# Patient Record
Sex: FEMALE | Race: WHITE | Employment: OTHER | URBAN - METROPOLITAN AREA
[De-identification: names, ages, dates, MRNs, and addresses within clinical notes are randomized per-mention and may not be internally consistent; named-entity substitution may affect disease eponyms.]

---

## 2017-04-20 ENCOUNTER — HOSPITAL ENCOUNTER (OUTPATIENT)
Age: 67
Setting detail: SPECIMEN
Discharge: HOME | End: 2017-04-20
Payer: COMMERCIAL

## 2017-04-20 ENCOUNTER — OFFICE VISIT (OUTPATIENT)
Dept: OBGYN CLINIC | Age: 67
End: 2017-04-20
Payer: COMMERCIAL

## 2017-04-20 VITALS
DIASTOLIC BLOOD PRESSURE: 90 MMHG | SYSTOLIC BLOOD PRESSURE: 140 MMHG | HEIGHT: 65 IN | BODY MASS INDEX: 28.72 KG/M2 | WEIGHT: 172.4 LBS

## 2017-04-20 DIAGNOSIS — Z01.419 VISIT FOR GYNECOLOGIC EXAMINATION: Primary | ICD-10-CM

## 2017-04-20 DIAGNOSIS — Z12.11 SCREEN FOR COLON CANCER: ICD-10-CM

## 2017-04-20 DIAGNOSIS — Z12.31 ENCOUNTER FOR SCREENING MAMMOGRAM FOR BREAST CANCER: ICD-10-CM

## 2017-04-20 PROCEDURE — 99396 PREV VISIT EST AGE 40-64: CPT | Performed by: OBSTETRICS & GYNECOLOGY

## 2017-04-20 RX ORDER — ESTRADIOL 0.1 MG/G
1 CREAM VAGINAL DAILY
Qty: 1 TUBE | Refills: 3 | Status: SHIPPED | OUTPATIENT
Start: 2017-04-20 | End: 2022-07-16

## 2017-04-20 ASSESSMENT — ENCOUNTER SYMPTOMS
ABDOMINAL PAIN: 0
ABDOMINAL DISTENTION: 0
DIARRHEA: 0
CONSTIPATION: 0
SHORTNESS OF BREATH: 0
BACK PAIN: 0
COUGH: 0

## 2017-04-25 LAB — CYTOLOGY REPORT: NORMAL

## 2017-04-28 ENCOUNTER — HOSPITAL ENCOUNTER (OUTPATIENT)
Age: 67
Discharge: HOME | End: 2017-04-28
Payer: COMMERCIAL

## 2017-04-28 LAB
ABSOLUTE EOS #: 0.2 K/UL (ref 0–0.4)
ABSOLUTE LYMPH #: 2.8 K/UL (ref 1–4.8)
ABSOLUTE MONO #: 0.5 K/UL (ref 0.1–1.3)
ALBUMIN SERPL-MCNC: 4.4 G/DL (ref 3.5–5.2)
ALBUMIN/GLOBULIN RATIO: ABNORMAL (ref 1–2.5)
ALP BLD-CCNC: 117 U/L (ref 35–104)
ALT SERPL-CCNC: 17 U/L (ref 5–33)
ANION GAP SERPL CALCULATED.3IONS-SCNC: 14 MMOL/L (ref 9–17)
AST SERPL-CCNC: 20 U/L
BASOPHILS # BLD: 1 %
BASOPHILS ABSOLUTE: 0.1 K/UL (ref 0–0.2)
BILIRUB SERPL-MCNC: 0.24 MG/DL (ref 0.3–1.2)
BUN BLDV-MCNC: 20 MG/DL (ref 8–23)
BUN/CREAT BLD: ABNORMAL (ref 9–20)
CALCIUM SERPL-MCNC: 9.6 MG/DL (ref 8.6–10.4)
CHLORIDE BLD-SCNC: 107 MMOL/L (ref 98–107)
CHOLESTEROL/HDL RATIO: 3.2
CHOLESTEROL: 171 MG/DL
CO2: 24 MMOL/L (ref 20–31)
CREAT SERPL-MCNC: 0.75 MG/DL (ref 0.5–0.9)
DIFFERENTIAL TYPE: NORMAL
EOSINOPHILS RELATIVE PERCENT: 3 %
GFR AFRICAN AMERICAN: >60 ML/MIN
GFR NON-AFRICAN AMERICAN: >60 ML/MIN
GFR SERPL CREATININE-BSD FRML MDRD: ABNORMAL ML/MIN/{1.73_M2}
GFR SERPL CREATININE-BSD FRML MDRD: ABNORMAL ML/MIN/{1.73_M2}
GLUCOSE BLD-MCNC: 87 MG/DL (ref 70–99)
HCT VFR BLD CALC: 44.7 % (ref 36–46)
HDLC SERPL-MCNC: 54 MG/DL
HEMOGLOBIN: 14.8 G/DL (ref 12–16)
LDL CHOLESTEROL: 96 MG/DL (ref 0–130)
LYMPHOCYTES # BLD: 42 %
MCH RBC QN AUTO: 30.3 PG (ref 26–34)
MCHC RBC AUTO-ENTMCNC: 33.2 G/DL (ref 31–37)
MCV RBC AUTO: 91.3 FL (ref 80–100)
MONOCYTES # BLD: 8 %
PDW BLD-RTO: 13.5 % (ref 11.5–14.9)
PLATELET # BLD: 252 K/UL (ref 150–450)
PLATELET ESTIMATE: NORMAL
PMV BLD AUTO: 7.7 FL (ref 6–12)
POTASSIUM SERPL-SCNC: 4.4 MMOL/L (ref 3.7–5.3)
RBC # BLD: 4.89 M/UL (ref 4–5.2)
RBC # BLD: NORMAL 10*6/UL
SEG NEUTROPHILS: 46 %
SEGMENTED NEUTROPHILS ABSOLUTE COUNT: 3.1 K/UL (ref 1.3–9.1)
SODIUM BLD-SCNC: 145 MMOL/L (ref 135–144)
THYROXINE, FREE: 0.96 NG/DL (ref 0.93–1.7)
TOTAL PROTEIN: 7.2 G/DL (ref 6.4–8.3)
TRIGL SERPL-MCNC: 107 MG/DL
TSH SERPL DL<=0.05 MIU/L-ACNC: 0.78 MIU/L (ref 0.3–5)
VLDLC SERPL CALC-MCNC: NORMAL MG/DL (ref 1–30)
WBC # BLD: 6.7 K/UL (ref 3.5–11)
WBC # BLD: NORMAL 10*3/UL

## 2017-04-28 PROCEDURE — 36415 COLL VENOUS BLD VENIPUNCTURE: CPT

## 2017-04-28 PROCEDURE — 80053 COMPREHEN METABOLIC PANEL: CPT

## 2017-04-28 PROCEDURE — 84439 ASSAY OF FREE THYROXINE: CPT

## 2017-04-28 PROCEDURE — 80061 LIPID PANEL: CPT

## 2017-04-28 PROCEDURE — 84443 ASSAY THYROID STIM HORMONE: CPT

## 2017-04-28 PROCEDURE — 85025 COMPLETE CBC W/AUTO DIFF WBC: CPT

## 2017-05-08 ENCOUNTER — HOSPITAL ENCOUNTER (OUTPATIENT)
Dept: WOMENS IMAGING | Age: 67
Discharge: HOME | End: 2017-05-08
Payer: COMMERCIAL

## 2017-05-08 DIAGNOSIS — Z12.31 ENCOUNTER FOR SCREENING MAMMOGRAM FOR BREAST CANCER: ICD-10-CM

## 2017-05-08 PROCEDURE — 77063 BREAST TOMOSYNTHESIS BI: CPT

## 2017-06-22 ENCOUNTER — HOSPITAL ENCOUNTER (OUTPATIENT)
Dept: WOMENS IMAGING | Age: 67
Discharge: HOME | End: 2017-06-22
Payer: COMMERCIAL

## 2017-06-22 DIAGNOSIS — Z78.0 POST-MENOPAUSAL: ICD-10-CM

## 2017-06-22 PROCEDURE — 77080 DXA BONE DENSITY AXIAL: CPT

## 2017-11-13 ENCOUNTER — HOSPITAL ENCOUNTER (OUTPATIENT)
Age: 67
Discharge: HOME | End: 2017-11-13
Payer: COMMERCIAL

## 2017-11-13 LAB
ABSOLUTE EOS #: 0.2 K/UL (ref 0–0.4)
ABSOLUTE IMMATURE GRANULOCYTE: NORMAL K/UL (ref 0–0.3)
ABSOLUTE LYMPH #: 2.5 K/UL (ref 1–4.8)
ABSOLUTE MONO #: 0.5 K/UL (ref 0.1–1.3)
ALBUMIN SERPL-MCNC: 4.3 G/DL (ref 3.5–5.2)
ALBUMIN/GLOBULIN RATIO: ABNORMAL (ref 1–2.5)
ALP BLD-CCNC: 110 U/L (ref 35–104)
ALT SERPL-CCNC: 15 U/L (ref 5–33)
ANION GAP SERPL CALCULATED.3IONS-SCNC: 14 MMOL/L (ref 9–17)
AST SERPL-CCNC: 18 U/L
BASOPHILS # BLD: 2 %
BASOPHILS ABSOLUTE: 0.1 K/UL (ref 0–0.2)
BILIRUB SERPL-MCNC: 0.25 MG/DL (ref 0.3–1.2)
BUN BLDV-MCNC: 14 MG/DL (ref 8–23)
BUN/CREAT BLD: ABNORMAL (ref 9–20)
CALCIUM SERPL-MCNC: 9.6 MG/DL (ref 8.6–10.4)
CHLORIDE BLD-SCNC: 106 MMOL/L (ref 98–107)
CHOLESTEROL/HDL RATIO: 3.5
CHOLESTEROL: 187 MG/DL
CO2: 24 MMOL/L (ref 20–31)
CREAT SERPL-MCNC: 0.66 MG/DL (ref 0.5–0.9)
DIFFERENTIAL TYPE: NORMAL
EOSINOPHILS RELATIVE PERCENT: 3 %
GFR AFRICAN AMERICAN: >60 ML/MIN
GFR NON-AFRICAN AMERICAN: >60 ML/MIN
GFR SERPL CREATININE-BSD FRML MDRD: ABNORMAL ML/MIN/{1.73_M2}
GFR SERPL CREATININE-BSD FRML MDRD: ABNORMAL ML/MIN/{1.73_M2}
GLUCOSE BLD-MCNC: 89 MG/DL (ref 70–99)
HCT VFR BLD CALC: 42.5 % (ref 36–46)
HDLC SERPL-MCNC: 54 MG/DL
HEMOGLOBIN: 14.5 G/DL (ref 12–16)
IMMATURE GRANULOCYTES: NORMAL %
LDL CHOLESTEROL: 105 MG/DL (ref 0–130)
LYMPHOCYTES # BLD: 37 %
MCH RBC QN AUTO: 31.5 PG (ref 26–34)
MCHC RBC AUTO-ENTMCNC: 34 G/DL (ref 31–37)
MCV RBC AUTO: 92.6 FL (ref 80–100)
MONOCYTES # BLD: 7 %
PDW BLD-RTO: 13.4 % (ref 11.5–14.9)
PLATELET # BLD: 293 K/UL (ref 150–450)
PLATELET ESTIMATE: NORMAL
PMV BLD AUTO: 7 FL (ref 6–12)
POTASSIUM SERPL-SCNC: 4.2 MMOL/L (ref 3.7–5.3)
RBC # BLD: 4.6 M/UL (ref 4–5.2)
RBC # BLD: NORMAL 10*6/UL
SEG NEUTROPHILS: 51 %
SEGMENTED NEUTROPHILS ABSOLUTE COUNT: 3.5 K/UL (ref 1.3–9.1)
SODIUM BLD-SCNC: 144 MMOL/L (ref 135–144)
THYROXINE, FREE: 1.1 NG/DL (ref 0.93–1.7)
TOTAL PROTEIN: 7.5 G/DL (ref 6.4–8.3)
TRIGL SERPL-MCNC: 140 MG/DL
TSH SERPL DL<=0.05 MIU/L-ACNC: 0.72 MIU/L (ref 0.3–5)
VLDLC SERPL CALC-MCNC: NORMAL MG/DL (ref 1–30)
WBC # BLD: 6.9 K/UL (ref 3.5–11)
WBC # BLD: NORMAL 10*3/UL

## 2017-11-13 PROCEDURE — 84439 ASSAY OF FREE THYROXINE: CPT

## 2017-11-13 PROCEDURE — 85025 COMPLETE CBC W/AUTO DIFF WBC: CPT

## 2017-11-13 PROCEDURE — 80053 COMPREHEN METABOLIC PANEL: CPT

## 2017-11-13 PROCEDURE — 84443 ASSAY THYROID STIM HORMONE: CPT

## 2017-11-13 PROCEDURE — 80061 LIPID PANEL: CPT

## 2017-11-13 PROCEDURE — 36415 COLL VENOUS BLD VENIPUNCTURE: CPT

## 2018-03-12 ENCOUNTER — HOSPITAL ENCOUNTER (OUTPATIENT)
Age: 68
Discharge: HOME | End: 2018-03-12
Payer: COMMERCIAL

## 2018-03-12 ENCOUNTER — HOSPITAL ENCOUNTER (OUTPATIENT)
Age: 68
Discharge: HOME | End: 2018-03-14
Payer: COMMERCIAL

## 2018-03-12 ENCOUNTER — HOSPITAL ENCOUNTER (OUTPATIENT)
Dept: GENERAL RADIOLOGY | Age: 68
Discharge: HOME | End: 2018-03-14
Payer: COMMERCIAL

## 2018-03-12 DIAGNOSIS — R05.9 COUGH: ICD-10-CM

## 2018-03-12 PROCEDURE — 87798 DETECT AGENT NOS DNA AMP: CPT

## 2018-03-12 PROCEDURE — 71046 X-RAY EXAM CHEST 2 VIEWS: CPT

## 2018-03-15 LAB
BORDETELLA PARAPERTUSSIS PCR: NOT DETECTED
BORDETELLA PERTUSSIS PCR: NOT DETECTED
BORDETELLA SOURCE: NORMAL

## 2018-04-25 ENCOUNTER — HOSPITAL ENCOUNTER (OUTPATIENT)
Age: 68
Setting detail: SPECIMEN
Discharge: HOME | End: 2018-04-25
Payer: COMMERCIAL

## 2018-04-25 ENCOUNTER — OFFICE VISIT (OUTPATIENT)
Dept: OBGYN CLINIC | Age: 68
End: 2018-04-25
Payer: COMMERCIAL

## 2018-04-25 VITALS
BODY MASS INDEX: 31.99 KG/M2 | DIASTOLIC BLOOD PRESSURE: 78 MMHG | SYSTOLIC BLOOD PRESSURE: 126 MMHG | HEIGHT: 65 IN | WEIGHT: 192 LBS

## 2018-04-25 DIAGNOSIS — Z01.419 VISIT FOR GYNECOLOGIC EXAMINATION: Primary | ICD-10-CM

## 2018-04-25 DIAGNOSIS — Z12.39 SCREENING FOR BREAST CANCER: ICD-10-CM

## 2018-04-25 DIAGNOSIS — Z12.11 ENCOUNTER FOR FIT (FECAL IMMUNOCHEMICAL TEST) SCREENING: ICD-10-CM

## 2018-04-25 PROCEDURE — 99397 PER PM REEVAL EST PAT 65+ YR: CPT | Performed by: OBSTETRICS & GYNECOLOGY

## 2018-04-25 ASSESSMENT — ENCOUNTER SYMPTOMS
ABDOMINAL DISTENTION: 0
BACK PAIN: 1
DIARRHEA: 0
COUGH: 0
SHORTNESS OF BREATH: 0
ABDOMINAL PAIN: 0
CONSTIPATION: 0

## 2018-04-25 ASSESSMENT — PATIENT HEALTH QUESTIONNAIRE - PHQ9
SUM OF ALL RESPONSES TO PHQ9 QUESTIONS 1 & 2: 0
2. FEELING DOWN, DEPRESSED OR HOPELESS: 0
1. LITTLE INTEREST OR PLEASURE IN DOING THINGS: 0
SUM OF ALL RESPONSES TO PHQ QUESTIONS 1-9: 0

## 2018-05-09 ENCOUNTER — HOSPITAL ENCOUNTER (OUTPATIENT)
Dept: WOMENS IMAGING | Age: 68
Discharge: HOME | End: 2018-05-11
Payer: COMMERCIAL

## 2018-05-09 DIAGNOSIS — Z12.39 SCREENING FOR BREAST CANCER: ICD-10-CM

## 2018-05-09 PROCEDURE — 77063 BREAST TOMOSYNTHESIS BI: CPT

## 2018-05-15 DIAGNOSIS — Z12.11 ENCOUNTER FOR FIT (FECAL IMMUNOCHEMICAL TEST) SCREENING: ICD-10-CM

## 2018-05-15 LAB
CONTROL: PRESENT
HEMOCCULT STL QL: NEGATIVE

## 2018-05-15 PROCEDURE — 82274 ASSAY TEST FOR BLOOD FECAL: CPT | Performed by: OBSTETRICS & GYNECOLOGY

## 2018-05-16 ENCOUNTER — HOSPITAL ENCOUNTER (OUTPATIENT)
Age: 68
Discharge: HOME | End: 2018-05-16
Payer: COMMERCIAL

## 2018-05-16 LAB
ALBUMIN SERPL-MCNC: 4 G/DL (ref 3.5–5.2)
ALBUMIN/GLOBULIN RATIO: ABNORMAL (ref 1–2.5)
ALP BLD-CCNC: 117 U/L (ref 35–104)
ALT SERPL-CCNC: 20 U/L (ref 5–33)
ANION GAP SERPL CALCULATED.3IONS-SCNC: 16 MMOL/L (ref 9–17)
AST SERPL-CCNC: 19 U/L
BILIRUB SERPL-MCNC: 0.16 MG/DL (ref 0.3–1.2)
BUN BLDV-MCNC: 18 MG/DL (ref 8–23)
BUN/CREAT BLD: ABNORMAL (ref 9–20)
CALCIUM SERPL-MCNC: 9.3 MG/DL (ref 8.6–10.4)
CHLORIDE BLD-SCNC: 107 MMOL/L (ref 98–107)
CHOLESTEROL/HDL RATIO: 3.7
CHOLESTEROL: 174 MG/DL
CO2: 19 MMOL/L (ref 20–31)
CREAT SERPL-MCNC: 0.69 MG/DL (ref 0.5–0.9)
GFR AFRICAN AMERICAN: >60 ML/MIN
GFR NON-AFRICAN AMERICAN: >60 ML/MIN
GFR SERPL CREATININE-BSD FRML MDRD: ABNORMAL ML/MIN/{1.73_M2}
GFR SERPL CREATININE-BSD FRML MDRD: ABNORMAL ML/MIN/{1.73_M2}
GLUCOSE BLD-MCNC: 91 MG/DL (ref 70–99)
HCT VFR BLD CALC: 41.2 % (ref 36–46)
HDLC SERPL-MCNC: 47 MG/DL
HEMOGLOBIN: 14.4 G/DL (ref 12–16)
LDL CHOLESTEROL: 93 MG/DL (ref 0–130)
MCH RBC QN AUTO: 32 PG (ref 26–34)
MCHC RBC AUTO-ENTMCNC: 35 G/DL (ref 31–37)
MCV RBC AUTO: 91.2 FL (ref 80–100)
NRBC AUTOMATED: NORMAL PER 100 WBC
PDW BLD-RTO: 13.4 % (ref 11.5–14.9)
PLATELET # BLD: 298 K/UL (ref 150–450)
PMV BLD AUTO: 7.3 FL (ref 6–12)
POTASSIUM SERPL-SCNC: 4.2 MMOL/L (ref 3.7–5.3)
RBC # BLD: 4.51 M/UL (ref 4–5.2)
SODIUM BLD-SCNC: 142 MMOL/L (ref 135–144)
THYROXINE, FREE: 1.04 NG/DL (ref 0.93–1.7)
TOTAL PROTEIN: 7.1 G/DL (ref 6.4–8.3)
TRIGL SERPL-MCNC: 169 MG/DL
TSH SERPL DL<=0.05 MIU/L-ACNC: 0.96 MIU/L (ref 0.3–5)
VLDLC SERPL CALC-MCNC: ABNORMAL MG/DL (ref 1–30)
WBC # BLD: 7.5 K/UL (ref 3.5–11)

## 2018-05-16 PROCEDURE — 80061 LIPID PANEL: CPT

## 2018-05-16 PROCEDURE — 84443 ASSAY THYROID STIM HORMONE: CPT

## 2018-05-16 PROCEDURE — 85027 COMPLETE CBC AUTOMATED: CPT

## 2018-05-16 PROCEDURE — 84439 ASSAY OF FREE THYROXINE: CPT

## 2018-05-16 PROCEDURE — 36415 COLL VENOUS BLD VENIPUNCTURE: CPT

## 2018-05-16 PROCEDURE — 80053 COMPREHEN METABOLIC PANEL: CPT

## 2018-05-17 ENCOUNTER — TELEPHONE (OUTPATIENT)
Dept: OBGYN CLINIC | Age: 68
End: 2018-05-17

## 2018-05-17 LAB — CYTOLOGY REPORT: NORMAL

## 2018-11-16 ENCOUNTER — HOSPITAL ENCOUNTER (OUTPATIENT)
Dept: GENERAL RADIOLOGY | Age: 68
Discharge: HOME | End: 2018-11-18
Payer: COMMERCIAL

## 2018-11-16 ENCOUNTER — HOSPITAL ENCOUNTER (OUTPATIENT)
Age: 68
Discharge: HOME | End: 2018-11-18
Payer: COMMERCIAL

## 2018-11-16 DIAGNOSIS — J40 BRONCHITIS: ICD-10-CM

## 2018-11-16 DIAGNOSIS — J45.21 EXACERBATION OF INTERMITTENT ASTHMA, UNSPECIFIED ASTHMA SEVERITY: ICD-10-CM

## 2018-11-16 PROCEDURE — 71046 X-RAY EXAM CHEST 2 VIEWS: CPT

## 2018-11-29 ENCOUNTER — HOSPITAL ENCOUNTER (OUTPATIENT)
Dept: CT IMAGING | Age: 68
Discharge: HOME | End: 2018-12-01
Payer: COMMERCIAL

## 2018-11-29 DIAGNOSIS — I26.99 OTHER PULMONARY EMBOLISM WITHOUT ACUTE COR PULMONALE, UNSPECIFIED CHRONICITY (HCC): ICD-10-CM

## 2018-11-29 LAB
BUN BLDV-MCNC: 17 MG/DL (ref 8–23)
CREAT SERPL-MCNC: 0.67 MG/DL (ref 0.5–0.9)
GFR AFRICAN AMERICAN: >60 ML/MIN
GFR NON-AFRICAN AMERICAN: >60 ML/MIN
GFR SERPL CREATININE-BSD FRML MDRD: NORMAL ML/MIN/{1.73_M2}
GFR SERPL CREATININE-BSD FRML MDRD: NORMAL ML/MIN/{1.73_M2}

## 2018-11-29 PROCEDURE — 36415 COLL VENOUS BLD VENIPUNCTURE: CPT

## 2018-11-29 PROCEDURE — 82565 ASSAY OF CREATININE: CPT

## 2018-11-29 PROCEDURE — 2580000003 HC RX 258: Performed by: FAMILY MEDICINE

## 2018-11-29 PROCEDURE — 6360000004 HC RX CONTRAST MEDICATION: Performed by: FAMILY MEDICINE

## 2018-11-29 PROCEDURE — 84520 ASSAY OF UREA NITROGEN: CPT

## 2018-11-29 PROCEDURE — 71260 CT THORAX DX C+: CPT

## 2018-11-29 RX ORDER — 0.9 % SODIUM CHLORIDE 0.9 %
80 INTRAVENOUS SOLUTION INTRAVENOUS ONCE
Status: COMPLETED | OUTPATIENT
Start: 2018-11-29 | End: 2018-11-29

## 2018-11-29 RX ORDER — SODIUM CHLORIDE 0.9 % (FLUSH) 0.9 %
10 SYRINGE (ML) INJECTION PRN
Status: DISCONTINUED | OUTPATIENT
Start: 2018-11-29 | End: 2018-12-02 | Stop reason: HOSPADM

## 2018-11-29 RX ADMIN — Medication 10 ML: at 14:56

## 2018-11-29 RX ADMIN — IOPAMIDOL 75 ML: 755 INJECTION, SOLUTION INTRAVENOUS at 14:56

## 2018-11-29 RX ADMIN — SODIUM CHLORIDE 80 ML: 9 INJECTION, SOLUTION INTRAVENOUS at 14:56

## 2018-11-30 ENCOUNTER — HOSPITAL ENCOUNTER (OUTPATIENT)
Age: 68
Discharge: HOME | End: 2018-11-30
Payer: COMMERCIAL

## 2018-11-30 LAB
ABSOLUTE EOS #: 0.2 K/UL (ref 0–0.4)
ABSOLUTE IMMATURE GRANULOCYTE: ABNORMAL K/UL (ref 0–0.3)
ABSOLUTE LYMPH #: 3.7 K/UL (ref 1–4.8)
ABSOLUTE MONO #: 1 K/UL (ref 0.1–1.2)
ALBUMIN SERPL-MCNC: 4.7 G/DL (ref 3.5–5.2)
ALBUMIN/GLOBULIN RATIO: 1.4 (ref 1–2.5)
ALP BLD-CCNC: 122 U/L (ref 35–104)
ALT SERPL-CCNC: 20 U/L (ref 5–33)
ANION GAP SERPL CALCULATED.3IONS-SCNC: 18 MMOL/L (ref 9–17)
AST SERPL-CCNC: 25 U/L
BASOPHILS # BLD: 1 % (ref 0–2)
BASOPHILS ABSOLUTE: 0.1 K/UL (ref 0–0.2)
BILIRUB SERPL-MCNC: 0.17 MG/DL (ref 0.3–1.2)
BNP INTERPRETATION: NORMAL
BUN BLDV-MCNC: 16 MG/DL (ref 8–23)
BUN/CREAT BLD: ABNORMAL (ref 9–20)
CALCIUM SERPL-MCNC: 9.9 MG/DL (ref 8.6–10.4)
CHLORIDE BLD-SCNC: 105 MMOL/L (ref 98–107)
CO2: 22 MMOL/L (ref 20–31)
CREAT SERPL-MCNC: 0.69 MG/DL (ref 0.5–0.9)
DIFFERENTIAL TYPE: ABNORMAL
EOSINOPHILS RELATIVE PERCENT: 2 % (ref 1–4)
GFR AFRICAN AMERICAN: >60 ML/MIN
GFR NON-AFRICAN AMERICAN: >60 ML/MIN
GFR SERPL CREATININE-BSD FRML MDRD: ABNORMAL ML/MIN/{1.73_M2}
GFR SERPL CREATININE-BSD FRML MDRD: ABNORMAL ML/MIN/{1.73_M2}
GLUCOSE BLD-MCNC: 93 MG/DL (ref 70–99)
HCT VFR BLD CALC: 47 % (ref 36–46)
HEMOGLOBIN: 15.4 G/DL (ref 12–16)
IMMATURE GRANULOCYTES: ABNORMAL %
LYMPHOCYTES # BLD: 33 % (ref 24–44)
MCH RBC QN AUTO: 30.4 PG (ref 26–34)
MCHC RBC AUTO-ENTMCNC: 32.8 G/DL (ref 31–37)
MCV RBC AUTO: 92.6 FL (ref 80–100)
MONOCYTES # BLD: 9 % (ref 2–11)
NRBC AUTOMATED: ABNORMAL PER 100 WBC
PDW BLD-RTO: 13.6 % (ref 12.5–15.4)
PLATELET # BLD: 378 K/UL (ref 140–450)
PLATELET ESTIMATE: ABNORMAL
PMV BLD AUTO: 7.3 FL (ref 6–12)
POTASSIUM SERPL-SCNC: 4.3 MMOL/L (ref 3.7–5.3)
PRO-BNP: <20 PG/ML
RBC # BLD: 5.07 M/UL (ref 4–5.2)
RBC # BLD: ABNORMAL 10*6/UL
SEG NEUTROPHILS: 55 % (ref 36–66)
SEGMENTED NEUTROPHILS ABSOLUTE COUNT: 6.4 K/UL (ref 1.8–7.7)
SODIUM BLD-SCNC: 145 MMOL/L (ref 135–144)
TOTAL PROTEIN: 8 G/DL (ref 6.4–8.3)
WBC # BLD: 11.4 K/UL (ref 3.5–11)
WBC # BLD: ABNORMAL 10*3/UL

## 2018-11-30 PROCEDURE — 85025 COMPLETE CBC W/AUTO DIFF WBC: CPT

## 2018-11-30 PROCEDURE — 36415 COLL VENOUS BLD VENIPUNCTURE: CPT

## 2018-11-30 PROCEDURE — 80053 COMPREHEN METABOLIC PANEL: CPT

## 2018-11-30 PROCEDURE — 83880 ASSAY OF NATRIURETIC PEPTIDE: CPT

## 2018-12-28 ENCOUNTER — HOSPITAL ENCOUNTER (OUTPATIENT)
Age: 68
Discharge: HOME | End: 2018-12-28
Payer: COMMERCIAL

## 2018-12-28 LAB
ABSOLUTE EOS #: 0.2 K/UL (ref 0–0.4)
ABSOLUTE IMMATURE GRANULOCYTE: ABNORMAL K/UL (ref 0–0.3)
ABSOLUTE LYMPH #: 2.2 K/UL (ref 1–4.8)
ABSOLUTE MONO #: 0.5 K/UL (ref 0.1–1.3)
ALBUMIN SERPL-MCNC: 4.2 G/DL (ref 3.5–5.2)
ALBUMIN/GLOBULIN RATIO: ABNORMAL (ref 1–2.5)
ALP BLD-CCNC: 106 U/L (ref 35–104)
ALT SERPL-CCNC: 19 U/L (ref 5–33)
ANION GAP SERPL CALCULATED.3IONS-SCNC: 12 MMOL/L (ref 9–17)
AST SERPL-CCNC: 20 U/L
BASOPHILS # BLD: 2 % (ref 0–2)
BASOPHILS ABSOLUTE: 0.1 K/UL (ref 0–0.2)
BILIRUB SERPL-MCNC: 0.2 MG/DL (ref 0.3–1.2)
BUN BLDV-MCNC: 19 MG/DL (ref 8–23)
BUN/CREAT BLD: ABNORMAL (ref 9–20)
CALCIUM SERPL-MCNC: 9.4 MG/DL (ref 8.6–10.4)
CHLORIDE BLD-SCNC: 107 MMOL/L (ref 98–107)
CHOLESTEROL/HDL RATIO: 3.7
CHOLESTEROL: 202 MG/DL
CO2: 24 MMOL/L (ref 20–31)
CREAT SERPL-MCNC: 0.75 MG/DL (ref 0.5–0.9)
DIFFERENTIAL TYPE: ABNORMAL
EOSINOPHILS RELATIVE PERCENT: 3 % (ref 0–4)
GFR AFRICAN AMERICAN: >60 ML/MIN
GFR NON-AFRICAN AMERICAN: >60 ML/MIN
GFR SERPL CREATININE-BSD FRML MDRD: ABNORMAL ML/MIN/{1.73_M2}
GFR SERPL CREATININE-BSD FRML MDRD: ABNORMAL ML/MIN/{1.73_M2}
GLUCOSE BLD-MCNC: 97 MG/DL (ref 70–99)
HCT VFR BLD CALC: 43.4 % (ref 36–46)
HDLC SERPL-MCNC: 55 MG/DL
HEMOGLOBIN: 14.6 G/DL (ref 12–16)
IMMATURE GRANULOCYTES: ABNORMAL %
LDL CHOLESTEROL: 122 MG/DL (ref 0–130)
LYMPHOCYTES # BLD: 32 % (ref 24–44)
MCH RBC QN AUTO: 30.9 PG (ref 26–34)
MCHC RBC AUTO-ENTMCNC: 33.6 G/DL (ref 31–37)
MCV RBC AUTO: 92 FL (ref 80–100)
MONOCYTES # BLD: 8 % (ref 1–7)
NRBC AUTOMATED: ABNORMAL PER 100 WBC
PDW BLD-RTO: 13.5 % (ref 11.5–14.9)
PLATELET # BLD: 302 K/UL (ref 150–450)
PLATELET ESTIMATE: ABNORMAL
PMV BLD AUTO: 6.9 FL (ref 6–12)
POTASSIUM SERPL-SCNC: 4.3 MMOL/L (ref 3.7–5.3)
RBC # BLD: 4.72 M/UL (ref 4–5.2)
RBC # BLD: ABNORMAL 10*6/UL
SEG NEUTROPHILS: 55 % (ref 36–66)
SEGMENTED NEUTROPHILS ABSOLUTE COUNT: 3.9 K/UL (ref 1.3–9.1)
SODIUM BLD-SCNC: 143 MMOL/L (ref 135–144)
TOTAL PROTEIN: 7.5 G/DL (ref 6.4–8.3)
TRIGL SERPL-MCNC: 126 MG/DL
VLDLC SERPL CALC-MCNC: ABNORMAL MG/DL (ref 1–30)
WBC # BLD: 7.1 K/UL (ref 3.5–11)
WBC # BLD: ABNORMAL 10*3/UL

## 2018-12-28 PROCEDURE — 80053 COMPREHEN METABOLIC PANEL: CPT

## 2018-12-28 PROCEDURE — 36415 COLL VENOUS BLD VENIPUNCTURE: CPT

## 2018-12-28 PROCEDURE — 85025 COMPLETE CBC W/AUTO DIFF WBC: CPT

## 2018-12-28 PROCEDURE — 80061 LIPID PANEL: CPT

## 2019-05-01 ENCOUNTER — HOSPITAL ENCOUNTER (OUTPATIENT)
Age: 69
Setting detail: SPECIMEN
Discharge: HOME | End: 2019-05-01
Payer: COMMERCIAL

## 2019-05-01 ENCOUNTER — OFFICE VISIT (OUTPATIENT)
Dept: OBGYN CLINIC | Age: 69
End: 2019-05-01
Payer: COMMERCIAL

## 2019-05-01 VITALS
WEIGHT: 203.8 LBS | BODY MASS INDEX: 33.95 KG/M2 | SYSTOLIC BLOOD PRESSURE: 160 MMHG | DIASTOLIC BLOOD PRESSURE: 90 MMHG | HEIGHT: 65 IN

## 2019-05-01 DIAGNOSIS — M85.859 OSTEOPENIA OF HIP, UNSPECIFIED LATERALITY: ICD-10-CM

## 2019-05-01 DIAGNOSIS — Z12.39 BREAST CANCER SCREENING: ICD-10-CM

## 2019-05-01 DIAGNOSIS — Z01.419 VISIT FOR GYNECOLOGIC EXAMINATION: Primary | ICD-10-CM

## 2019-05-01 PROCEDURE — 99397 PER PM REEVAL EST PAT 65+ YR: CPT | Performed by: OBSTETRICS & GYNECOLOGY

## 2019-05-01 RX ORDER — TRAMADOL HYDROCHLORIDE 50 MG/1
1 TABLET ORAL
COMMUNITY
End: 2022-06-13

## 2019-05-01 ASSESSMENT — ENCOUNTER SYMPTOMS
CONSTIPATION: 0
DIARRHEA: 0
COUGH: 0
BACK PAIN: 0
SHORTNESS OF BREATH: 0
ABDOMINAL DISTENTION: 0
ABDOMINAL PAIN: 0

## 2019-05-01 NOTE — PROGRESS NOTES
Subjective:      Patient ID: Melly Reece is a 79 y.o. female. HPI   Melly Reece is a 79 y.o. , here for her annual exam.  The patient was seen and examined. The patients past medical, surgical, social and family history were reviewed. Current medications and allergies were reviewed, and documented in the chart. Fibromyalgia still an issue. The frustration is that she never knows what we'll set it off. She is going to need both knees replaced, but she decides to wait until after assisted at the end of . She has had no seizures. No GYN complaints    Menopausal history: No bleeding, no hot flashes    Blood pressure (!) 160/90, height 5' 5\" (1.651 m), weight 203 lb 12.8 oz (92.4 kg), not currently breastfeeding.   Wt Readings from Last 3 Encounters:   19 203 lb 12.8 oz (92.4 kg)   18 192 lb (87.1 kg)   17 172 lb 6.4 oz (78.2 kg)     Recent Results (from the past 8736 hour(s))   POCT Fecal Immunochemical Test (FIT)    Collection Time: 05/15/18  9:11 AM   Result Value Ref Range    OCCULT BLOOD FECAL negative     Control present    CBC    Collection Time: 18  6:59 AM   Result Value Ref Range    WBC 7.5 3.5 - 11.0 k/uL    RBC 4.51 4.0 - 5.2 m/uL    Hemoglobin 14.4 12.0 - 16.0 g/dL    Hematocrit 41.2 36 - 46 %    MCV 91.2 80 - 100 fL    MCH 32.0 26 - 34 pg    MCHC 35.0 31 - 37 g/dL    RDW 13.4 11.5 - 14.9 %    Platelets 452 386 - 672 k/uL    MPV 7.3 6.0 - 12.0 fL    NRBC Automated NOT REPORTED per 100 WBC   Comprehensive Metabolic Panel    Collection Time: 18  6:59 AM   Result Value Ref Range    Glucose 91 70 - 99 mg/dL    BUN 18 8 - 23 mg/dL    CREATININE 0.69 0.50 - 0.90 mg/dL    Bun/Cre Ratio NOT REPORTED 9 - 20    Calcium 9.3 8.6 - 10.4 mg/dL    Sodium 142 135 - 144 mmol/L    Potassium 4.2 3.7 - 5.3 mmol/L    Chloride 107 98 - 107 mmol/L    CO2 19 (L) 20 - 31 mmol/L    Anion Gap 16 9 - 17 mmol/L    Alkaline Phosphatase 117 (H) 35 - 104 U/L    ALT 20 5 - 33 U/L    AST 19 <32 U/L    Total Bilirubin 0.16 (L) 0.3 - 1.2 mg/dL    Total Protein 7.1 6.4 - 8.3 g/dL    Alb 4.0 3.5 - 5.2 g/dL    Albumin/Globulin Ratio NOT REPORTED 1.0 - 2.5    GFR Non-African American >60 >60 mL/min    GFR African American >60 >60 mL/min    GFR Comment          GFR Staging NOT REPORTED    T4, Free    Collection Time: 05/16/18  6:59 AM   Result Value Ref Range    Thyroxine, Free 1.04 0.93 - 1.70 ng/dL   Lipid Panel    Collection Time: 05/16/18  6:59 AM   Result Value Ref Range    Cholesterol 174 <200 mg/dL    HDL 47 >40 mg/dL    LDL Cholesterol 93 0 - 130 mg/dL    Chol/HDL Ratio 3.7 <5    Triglycerides 169 (H) <150 mg/dL    VLDL NOT REPORTED 1 - 30 mg/dL   TSH without Reflex    Collection Time: 05/16/18  6:59 AM   Result Value Ref Range    TSH 0.96 0.30 - 5.00 mIU/L   BUN & Creatinine    Collection Time: 11/29/18 11:49 AM   Result Value Ref Range    BUN 17 8 - 23 mg/dL    CREATININE 0.67 0.50 - 0.90 mg/dL    GFR Non-African American >60 >60 mL/min    GFR African American >60 >60 mL/min    GFR Comment          GFR Staging NOT REPORTED    Brain Natriuretic Peptide    Collection Time: 11/30/18  3:00 PM   Result Value Ref Range    Pro-BNP <20 <300 pg/mL    BNP Interpretation         CBC Auto Differential    Collection Time: 11/30/18  3:00 PM   Result Value Ref Range    WBC 11.4 (H) 3.5 - 11.0 k/uL    RBC 5.07 4.0 - 5.2 m/uL    Hemoglobin 15.4 12.0 - 16.0 g/dL    Hematocrit 47.0 (H) 36 - 46 %    MCV 92.6 80 - 100 fL    MCH 30.4 26 - 34 pg    MCHC 32.8 31 - 37 g/dL    RDW 13.6 12.5 - 15.4 %    Platelets 671 654 - 970 k/uL    MPV 7.3 6.0 - 12.0 fL    NRBC Automated NOT REPORTED per 100 WBC    Differential Type NOT REPORTED     Seg Neutrophils 55 36 - 66 %    Lymphocytes 33 24 - 44 %    Monocytes 9 2 - 11 %    Eosinophils % 2 1 - 4 %    Basophils 1 0 - 2 %    Immature Granulocytes NOT REPORTED 0 %    Segs Absolute 6.40 1.8 - 7.7 k/uL    Absolute Lymph # 3.70 1.0 - 4.8 k/uL    Absolute Mono # 1.00 0.1 - 1.2 k/uL Absolute Eos # 0.20 0.0 - 0.4 k/uL    Basophils # 0.10 0.0 - 0.2 k/uL    Absolute Immature Granulocyte NOT REPORTED 0.00 - 0.30 k/uL    WBC Morphology NOT REPORTED     RBC Morphology NOT REPORTED     Platelet Estimate NOT REPORTED    Comprehensive Metabolic Panel    Collection Time: 11/30/18  3:00 PM   Result Value Ref Range    Glucose 93 70 - 99 mg/dL    BUN 16 8 - 23 mg/dL    CREATININE 0.69 0.50 - 0.90 mg/dL    Bun/Cre Ratio NOT REPORTED 9 - 20    Calcium 9.9 8.6 - 10.4 mg/dL    Sodium 145 (H) 135 - 144 mmol/L    Potassium 4.3 3.7 - 5.3 mmol/L    Chloride 105 98 - 107 mmol/L    CO2 22 20 - 31 mmol/L    Anion Gap 18 (H) 9 - 17 mmol/L    Alkaline Phosphatase 122 (H) 35 - 104 U/L    ALT 20 5 - 33 U/L    AST 25 <32 U/L    Total Bilirubin 0.17 (L) 0.3 - 1.2 mg/dL    Total Protein 8.0 6.4 - 8.3 g/dL    Alb 4.7 3.5 - 5.2 g/dL    Albumin/Globulin Ratio 1.4 1.0 - 2.5    GFR Non-African American >60 >60 mL/min    GFR African American >60 >60 mL/min    GFR Comment          GFR Staging NOT REPORTED    CBC Auto Differential    Collection Time: 12/28/18  7:13 AM   Result Value Ref Range    WBC 7.1 3.5 - 11.0 k/uL    RBC 4.72 4.0 - 5.2 m/uL    Hemoglobin 14.6 12.0 - 16.0 g/dL    Hematocrit 43.4 36 - 46 %    MCV 92.0 80 - 100 fL    MCH 30.9 26 - 34 pg    MCHC 33.6 31 - 37 g/dL    RDW 13.5 11.5 - 14.9 %    Platelets 961 962 - 864 k/uL    MPV 6.9 6.0 - 12.0 fL    NRBC Automated NOT REPORTED per 100 WBC    Differential Type NOT REPORTED     Seg Neutrophils 55 36 - 66 %    Lymphocytes 32 24 - 44 %    Monocytes 8 (H) 1 - 7 %    Eosinophils % 3 0 - 4 %    Basophils 2 0 - 2 %    Immature Granulocytes NOT REPORTED 0 %    Segs Absolute 3.90 1.3 - 9.1 k/uL    Absolute Lymph # 2.20 1.0 - 4.8 k/uL    Absolute Mono # 0.50 0.1 - 1.3 k/uL    Absolute Eos # 0.20 0.0 - 0.4 k/uL    Basophils # 0.10 0.0 - 0.2 k/uL    Absolute Immature Granulocyte NOT REPORTED 0.00 - 0.30 k/uL    WBC Morphology NOT REPORTED     RBC Morphology NOT REPORTED Platelet Estimate NOT REPORTED    Comprehensive Metabolic Panel    Collection Time: 12/28/18  7:13 AM   Result Value Ref Range    Glucose 97 70 - 99 mg/dL    BUN 19 8 - 23 mg/dL    CREATININE 0.75 0.50 - 0.90 mg/dL    Bun/Cre Ratio NOT REPORTED 9 - 20    Calcium 9.4 8.6 - 10.4 mg/dL    Sodium 143 135 - 144 mmol/L    Potassium 4.3 3.7 - 5.3 mmol/L    Chloride 107 98 - 107 mmol/L    CO2 24 20 - 31 mmol/L    Anion Gap 12 9 - 17 mmol/L    Alkaline Phosphatase 106 (H) 35 - 104 U/L    ALT 19 5 - 33 U/L    AST 20 <32 U/L    Total Bilirubin 0.20 (L) 0.3 - 1.2 mg/dL    Total Protein 7.5 6.4 - 8.3 g/dL    Alb 4.2 3.5 - 5.2 g/dL    Albumin/Globulin Ratio NOT REPORTED 1.0 - 2.5    GFR Non-African American >60 >60 mL/min    GFR African American >60 >60 mL/min    GFR Comment          GFR Staging NOT REPORTED    Lipid Panel    Collection Time: 12/28/18  7:13 AM   Result Value Ref Range    Cholesterol 202 (H) <200 mg/dL    HDL 55 >40 mg/dL    LDL Cholesterol 122 0 - 130 mg/dL    Chol/HDL Ratio 3.7 <5    Triglycerides 126 <150 mg/dL    VLDL NOT REPORTED 1 - 30 mg/dL     Past Medical History:   Diagnosis Date    Arthritis     knees and spine    Asthma     Chest pain     hx. of , due to stress , acid reflux acting up, esophogeal spasm,    Esophageal spasm     hx. 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 Left side of face droops.  Osteopenia 06/22/2017    dexa scan    PONV (postoperative nausea and vomiting)     Seizure (HCC)     left side brain, stopped with topamax, see's neurologis Dr. Melissa Lawler every 6 months.     Wears glasses                                                                    Past Surgical History:   Procedure Laterality Date    ACROMIOPLASTY Left 2/3/16    ARTHROSCOPY SHOULDER WITH SAD EXCISION LOOSE BODIES AND DEBRIDEMENT LABRAL TEAR    APPENDECTOMY      BACK SURGERY      for herniation    BLADDER SURGERY      dilation     SECTION, LOW TRANSVERSE  1973    CHOLECYSTECTOMY  2012    lap    COLONOSCOPY      x2    ECTOPIC PREGNANCY SURGERY      right side    ENDOSCOPY, COLON, DIAGNOSTIC      egd x2.  KNEE ARTHROSCOPY  10/10    left knee    KNEE ARTHROSCOPY      right knee    TUBAL LIGATION  1986    bilateral     Family History   Problem Relation Age of Onset    Heart Disease Father         [facility] Osteoporosis Mother     Cancer Mother 80        breast    Breast Cancer Mother [de-identified]    Diabetes Sister     Hypertension Sister     COPD Sister     Hypertension Sister     Diabetes Sister     Ovarian Cancer Sister 68    Breast Cancer Sister     Cancer Brother         skin    Heart Attack Brother     Other Brother         hiatal hernia    Other Son         hiatal hernia    Other Grandchild         hiatal hernia     Social History     Tobacco Use   Smoking Status Never Smoker   Smokeless Tobacco Never Used     Social History     Substance and Sexual Activity   Alcohol Use No    Comment: occasional wine       MEDICATIONS:  Current Outpatient Medications   Medication Sig Dispense Refill    traMADol (ULTRAM) 50 MG tablet Take 1 tablet by mouth.  vitamin D (CHOLECALCIFEROL) 1000 UNIT TABS tablet Take 1,000 Units by mouth daily      cetirizine (ZYRTEC) 10 MG tablet Take 10 mg by mouth daily      losartan (COZAAR) 50 MG tablet Take 50 mg by mouth daily.  omeprazole (PRILOSEC) 40 MG capsule Take 40 mg by mouth daily.  topiramate (TOPAMAX) 100 MG tablet Take 100 mg by mouth 2 times daily Takes for migraine seizures.  montelukast (SINGULAIR) 10 MG tablet Take 10 mg by mouth nightly.  fluticasone-salmeterol (ADVAIR DISKUS) 250-50 MCG/DOSE AEPB Inhale 1 puff into the lungs every 12 hours.  albuterol (PROVENTIL HFA;VENTOLIN HFA) 108 (90 BASE) MCG/ACT inhaler Inhale 2 puffs into the lungs every 6 hours as needed.  Calcium Carbonate-Vitamin D (CALCIUM + D) 600-200 MG-UNIT TABS Take  by mouth.  therapeutic multivitamin-minerals (THERAGRAN-M) tablet Take 1 tablet by mouth daily.  frovatriptan succinate (FROVA) 2.5 MG TABS Take 2.5 mg by mouth as needed.  Hypromellose (SYSTANE OVERNIGHT THERAPY) 0.3 % GEL Apply to eye      estradiol (ESTRACE VAGINAL) 0.1 MG/GM vaginal cream Place 1 g vaginally daily 1 Tube 3     No current facility-administered medications for this visit. Facility-Administered Medications Ordered in Other Visits   Medication Dose Route Frequency Provider Last Rate Last Dose    0.9 % sodium chloride infusion   Intravenous Continuous Asher Baez  75 mL/hr at 04/16/15 0751         ALLERGIES:  Nitroglycerin; Macrodantin [nitrofurantoin]; Pcn [penicillins]; and Sulfa antibiotics      Review of Systems   Constitutional: Negative for appetite change and fatigue. HENT: Negative for congestion and hearing loss. Eyes: Negative for visual disturbance. Respiratory: Negative for cough and shortness of breath. Cardiovascular: Negative for chest pain and palpitations. Gastrointestinal: Negative for abdominal distention, abdominal pain, constipation and diarrhea. Genitourinary: Negative for flank pain, frequency, menstrual problem, pelvic pain and vaginal discharge. Musculoskeletal: Negative for back pain. Neurological: Negative for syncope and headaches. Psychiatric/Behavioral: Negative for behavioral problems. Objective:   Physical Exam   Constitutional: She is oriented to person, place, and time. She appears well-developed and well-nourished. Eyes: Pupils are equal, round, and reactive to light. Neck: No tracheal deviation present. No thyromegaly present. Cardiovascular: Normal rate, regular rhythm and normal heart sounds. Pulmonary/Chest: Effort normal and breath sounds normal. No respiratory distress. Right breast exhibits no inverted nipple.  Left breast exhibits no inverted nipple, no mass, no nipple discharge, no skin change and no tenderness. No breast tenderness, discharge or bleeding. Breasts are symmetrical.   Abdominal: Soft. Bowel sounds are normal. She exhibits no distension and no mass. There is no tenderness. There is no CVA tenderness. Hernia confirmed negative in the right inguinal area and confirmed negative in the left inguinal area. Genitourinary: No breast tenderness, discharge or bleeding. There is no rash or lesion on the right labia. There is no rash or lesion on the left labia. Uterus is not deviated, not enlarged and not fixed. Cervix exhibits no motion tenderness, no discharge and no friability. Right adnexum displays no mass, no tenderness and no fullness. Left adnexum displays no mass, no tenderness and no fullness. No tenderness in the vagina. No vaginal discharge found. Musculoskeletal: She exhibits no edema or tenderness. Lymphadenopathy:     She has no cervical adenopathy. Right: No inguinal adenopathy present. Left: No inguinal adenopathy present. Neurological: She is alert and oriented to person, place, and time. Skin: Skin is warm and dry. Psychiatric: She has a normal mood and affect. Her behavior is normal. Judgment and thought content normal.       Assessment:      Encounter Diagnoses   Name Primary?  Visit for gynecologic examination Yes    Breast cancer screening     Osteopenia of hip, unspecified laterality            Plan:      1. Discussed new pap smear guidelines. 2. Screening mammogram discussed and advised yearly if within normal limits. 3. Calcium and Vitamin D dosing reviewed. 4. Colonoscopy screening reviewed. 5. Bone density testing reviewed.              Keke Dougherty MD

## 2019-05-07 LAB — CYTOLOGY REPORT: NORMAL

## 2019-05-10 ENCOUNTER — HOSPITAL ENCOUNTER (OUTPATIENT)
Dept: WOMENS IMAGING | Age: 69
Discharge: HOME | End: 2019-05-12
Payer: COMMERCIAL

## 2019-05-10 DIAGNOSIS — Z12.39 BREAST CANCER SCREENING: ICD-10-CM

## 2019-05-10 PROCEDURE — 77063 BREAST TOMOSYNTHESIS BI: CPT

## 2019-06-06 ENCOUNTER — HOSPITAL ENCOUNTER (OUTPATIENT)
Age: 69
Discharge: HOME | End: 2019-06-06
Payer: COMMERCIAL

## 2019-06-06 LAB
ABSOLUTE EOS #: 0.3 K/UL (ref 0–0.4)
ABSOLUTE IMMATURE GRANULOCYTE: ABNORMAL K/UL (ref 0–0.3)
ABSOLUTE LYMPH #: 2.7 K/UL (ref 1–4.8)
ABSOLUTE MONO #: 0.6 K/UL (ref 0.1–1.3)
ALBUMIN SERPL-MCNC: 4.2 G/DL (ref 3.5–5.2)
ALBUMIN/GLOBULIN RATIO: ABNORMAL (ref 1–2.5)
ALP BLD-CCNC: 99 U/L (ref 35–104)
ALT SERPL-CCNC: 20 U/L (ref 5–33)
ANION GAP SERPL CALCULATED.3IONS-SCNC: 13 MMOL/L (ref 9–17)
AST SERPL-CCNC: 21 U/L
BASOPHILS # BLD: 2 % (ref 0–2)
BASOPHILS ABSOLUTE: 0.1 K/UL (ref 0–0.2)
BILIRUB SERPL-MCNC: 0.21 MG/DL (ref 0.3–1.2)
BUN BLDV-MCNC: 17 MG/DL (ref 8–23)
BUN/CREAT BLD: ABNORMAL (ref 9–20)
CALCIUM SERPL-MCNC: 9.5 MG/DL (ref 8.6–10.4)
CHLORIDE BLD-SCNC: 108 MMOL/L (ref 98–107)
CHOLESTEROL/HDL RATIO: 3.7
CHOLESTEROL: 176 MG/DL
CO2: 19 MMOL/L (ref 20–31)
CREAT SERPL-MCNC: 0.73 MG/DL (ref 0.5–0.9)
DIFFERENTIAL TYPE: ABNORMAL
EOSINOPHILS RELATIVE PERCENT: 5 % (ref 0–4)
GFR AFRICAN AMERICAN: >60 ML/MIN
GFR NON-AFRICAN AMERICAN: >60 ML/MIN
GFR SERPL CREATININE-BSD FRML MDRD: ABNORMAL ML/MIN/{1.73_M2}
GFR SERPL CREATININE-BSD FRML MDRD: ABNORMAL ML/MIN/{1.73_M2}
GLUCOSE BLD-MCNC: 94 MG/DL (ref 70–99)
HCT VFR BLD CALC: 43.2 % (ref 36–46)
HDLC SERPL-MCNC: 47 MG/DL
HEMOGLOBIN: 14.5 G/DL (ref 12–16)
IMMATURE GRANULOCYTES: ABNORMAL %
LDL CHOLESTEROL: 103 MG/DL (ref 0–130)
LYMPHOCYTES # BLD: 40 % (ref 24–44)
MCH RBC QN AUTO: 30.8 PG (ref 26–34)
MCHC RBC AUTO-ENTMCNC: 33.6 G/DL (ref 31–37)
MCV RBC AUTO: 91.7 FL (ref 80–100)
MONOCYTES # BLD: 10 % (ref 1–7)
NRBC AUTOMATED: ABNORMAL PER 100 WBC
PDW BLD-RTO: 13.4 % (ref 11.5–14.9)
PLATELET # BLD: 306 K/UL (ref 150–450)
PLATELET ESTIMATE: ABNORMAL
PMV BLD AUTO: 7.1 FL (ref 6–12)
POTASSIUM SERPL-SCNC: 4.1 MMOL/L (ref 3.7–5.3)
RBC # BLD: 4.72 M/UL (ref 4–5.2)
RBC # BLD: ABNORMAL 10*6/UL
SEG NEUTROPHILS: 43 % (ref 36–66)
SEGMENTED NEUTROPHILS ABSOLUTE COUNT: 2.9 K/UL (ref 1.3–9.1)
SODIUM BLD-SCNC: 140 MMOL/L (ref 135–144)
TOTAL PROTEIN: 7.1 G/DL (ref 6.4–8.3)
TRIGL SERPL-MCNC: 129 MG/DL
VLDLC SERPL CALC-MCNC: NORMAL MG/DL (ref 1–30)
WBC # BLD: 6.6 K/UL (ref 3.5–11)
WBC # BLD: ABNORMAL 10*3/UL

## 2019-06-06 PROCEDURE — 80053 COMPREHEN METABOLIC PANEL: CPT

## 2019-06-06 PROCEDURE — 36415 COLL VENOUS BLD VENIPUNCTURE: CPT

## 2019-06-06 PROCEDURE — 85025 COMPLETE CBC W/AUTO DIFF WBC: CPT

## 2019-06-06 PROCEDURE — 80061 LIPID PANEL: CPT

## 2019-06-24 ENCOUNTER — HOSPITAL ENCOUNTER (OUTPATIENT)
Dept: WOMENS IMAGING | Age: 69
Discharge: HOME | End: 2019-06-26
Payer: COMMERCIAL

## 2019-06-24 DIAGNOSIS — M85.859 OSTEOPENIA OF HIP, UNSPECIFIED LATERALITY: ICD-10-CM

## 2019-06-24 PROCEDURE — 77080 DXA BONE DENSITY AXIAL: CPT

## 2019-12-02 ENCOUNTER — HOSPITAL ENCOUNTER (OUTPATIENT)
Age: 69
Discharge: HOME | End: 2019-12-02
Payer: MEDICARE

## 2019-12-02 LAB
ABSOLUTE EOS #: 0.29 K/UL (ref 0–0.44)
ABSOLUTE IMMATURE GRANULOCYTE: <0.03 K/UL (ref 0–0.3)
ABSOLUTE LYMPH #: 3.75 K/UL (ref 1.1–3.7)
ABSOLUTE MONO #: 0.62 K/UL (ref 0.1–1.2)
ALBUMIN SERPL-MCNC: 4.3 G/DL (ref 3.5–5.2)
ALBUMIN/GLOBULIN RATIO: 1.3 (ref 1–2.5)
ALP BLD-CCNC: 105 U/L (ref 35–104)
ALT SERPL-CCNC: 21 U/L (ref 5–33)
ANION GAP SERPL CALCULATED.3IONS-SCNC: 18 MMOL/L (ref 9–17)
AST SERPL-CCNC: 23 U/L
BASOPHILS # BLD: 1 % (ref 0–2)
BASOPHILS ABSOLUTE: 0.12 K/UL (ref 0–0.2)
BILIRUB SERPL-MCNC: 0.25 MG/DL (ref 0.3–1.2)
BUN BLDV-MCNC: 14 MG/DL (ref 8–23)
BUN/CREAT BLD: ABNORMAL (ref 9–20)
CALCIUM SERPL-MCNC: 9.7 MG/DL (ref 8.6–10.4)
CHLORIDE BLD-SCNC: 109 MMOL/L (ref 98–107)
CHOLESTEROL, FASTING: 185 MG/DL
CHOLESTEROL/HDL RATIO: 3.8
CO2: 17 MMOL/L (ref 20–31)
CREAT SERPL-MCNC: 0.65 MG/DL (ref 0.5–0.9)
DIFFERENTIAL TYPE: ABNORMAL
EOSINOPHILS RELATIVE PERCENT: 3 % (ref 1–4)
GFR AFRICAN AMERICAN: >60 ML/MIN
GFR NON-AFRICAN AMERICAN: >60 ML/MIN
GFR SERPL CREATININE-BSD FRML MDRD: ABNORMAL ML/MIN/{1.73_M2}
GFR SERPL CREATININE-BSD FRML MDRD: ABNORMAL ML/MIN/{1.73_M2}
GLUCOSE FASTING: 94 MG/DL (ref 70–99)
HCT VFR BLD CALC: 47.6 % (ref 36.3–47.1)
HDLC SERPL-MCNC: 49 MG/DL
HEMOGLOBIN: 15.4 G/DL (ref 11.9–15.1)
IMMATURE GRANULOCYTES: 0 %
LDL CHOLESTEROL: 92 MG/DL (ref 0–130)
LYMPHOCYTES # BLD: 42 % (ref 24–43)
MCH RBC QN AUTO: 30 PG (ref 25.2–33.5)
MCHC RBC AUTO-ENTMCNC: 32.4 G/DL (ref 28.4–34.8)
MCV RBC AUTO: 92.6 FL (ref 82.6–102.9)
MONOCYTES # BLD: 7 % (ref 3–12)
NRBC AUTOMATED: 0 PER 100 WBC
PDW BLD-RTO: 13 % (ref 11.8–14.4)
PLATELET # BLD: 324 K/UL (ref 138–453)
PLATELET ESTIMATE: ABNORMAL
PMV BLD AUTO: 9 FL (ref 8.1–13.5)
POTASSIUM SERPL-SCNC: 4.4 MMOL/L (ref 3.7–5.3)
RBC # BLD: 5.14 M/UL (ref 3.95–5.11)
RBC # BLD: ABNORMAL 10*6/UL
SEG NEUTROPHILS: 47 % (ref 36–65)
SEGMENTED NEUTROPHILS ABSOLUTE COUNT: 4.22 K/UL (ref 1.5–8.1)
SODIUM BLD-SCNC: 144 MMOL/L (ref 135–144)
T3 FREE: 3.11 PG/ML (ref 2.02–4.43)
THYROXINE, FREE: 1.06 NG/DL (ref 0.93–1.7)
TOTAL PROTEIN: 7.6 G/DL (ref 6.4–8.3)
TRIGLYCERIDE, FASTING: 222 MG/DL
TSH SERPL DL<=0.05 MIU/L-ACNC: 0.95 MIU/L (ref 0.3–5)
VLDLC SERPL CALC-MCNC: ABNORMAL MG/DL (ref 1–30)
WBC # BLD: 9 K/UL (ref 3.5–11.3)
WBC # BLD: ABNORMAL 10*3/UL

## 2019-12-02 PROCEDURE — 84439 ASSAY OF FREE THYROXINE: CPT

## 2019-12-02 PROCEDURE — 84443 ASSAY THYROID STIM HORMONE: CPT

## 2019-12-02 PROCEDURE — 80053 COMPREHEN METABOLIC PANEL: CPT

## 2019-12-02 PROCEDURE — 80061 LIPID PANEL: CPT

## 2019-12-02 PROCEDURE — 36415 COLL VENOUS BLD VENIPUNCTURE: CPT

## 2019-12-02 PROCEDURE — 84481 FREE ASSAY (FT-3): CPT

## 2019-12-02 PROCEDURE — 85025 COMPLETE CBC W/AUTO DIFF WBC: CPT

## 2020-06-01 ENCOUNTER — HOSPITAL ENCOUNTER (OUTPATIENT)
Age: 70
Setting detail: SPECIMEN
Discharge: HOME | End: 2020-06-01
Payer: MEDICARE

## 2020-06-01 ENCOUNTER — OFFICE VISIT (OUTPATIENT)
Dept: OBGYN CLINIC | Age: 70
End: 2020-06-01
Payer: MEDICARE

## 2020-06-01 VITALS
DIASTOLIC BLOOD PRESSURE: 72 MMHG | BODY MASS INDEX: 33.55 KG/M2 | HEIGHT: 65 IN | WEIGHT: 201.4 LBS | SYSTOLIC BLOOD PRESSURE: 148 MMHG

## 2020-06-01 PROCEDURE — G0101 CA SCREEN;PELVIC/BREAST EXAM: HCPCS | Performed by: OBSTETRICS & GYNECOLOGY

## 2020-06-01 ASSESSMENT — ENCOUNTER SYMPTOMS
ABDOMINAL DISTENTION: 0
COUGH: 0
DIARRHEA: 0
ABDOMINAL PAIN: 0
BACK PAIN: 0
SHORTNESS OF BREATH: 0
CONSTIPATION: 0

## 2020-06-01 NOTE — PROGRESS NOTES
damage to left side of face when born,  due to forcep delivery . Left side of face droops.  Osteopenia 2017    dexa scan    PONV (postoperative nausea and vomiting)     Seizure (HCC)     left side brain, stopped with topamax, see's neurologis Dr. Lorrie Gonzalez every 6 months.     Wears glasses                                                                    Past Surgical History:   Procedure Laterality Date    ACROMIOPLASTY Left 2/3/16    ARTHROSCOPY SHOULDER WITH SAD EXCISION LOOSE BODIES AND DEBRIDEMENT LABRAL TEAR    APPENDECTOMY      BACK SURGERY      for herniation    BLADDER SURGERY      dilation     SECTION, LOW TRANSVERSE  1973    CHOLECYSTECTOMY      lap    COLONOSCOPY      x2    ECTOPIC PREGNANCY SURGERY  1988    right side    ENDOSCOPY, COLON, DIAGNOSTIC      egd x2.    JOINT REPLACEMENT  2019    KNEE ARTHROSCOPY  10/10    left knee    KNEE ARTHROSCOPY      right knee    TUBAL LIGATION  1986    bilateral     Family History   Problem Relation Age of Onset    Heart Disease Father         MI   [address] [facility] [place] Osteoporosis Mother     Cancer Mother 80        breast    Breast Cancer Mother [de-identified]    Diabetes Sister     Hypertension Sister     COPD Sister     Hypertension Sister     Diabetes Sister     Ovarian Cancer Sister 68    Breast Cancer Sister     Cancer Brother         skin    Heart Attack Brother     Other Brother         hiatal hernia    Other Son         hiatal hernia    Other Grandchild         hiatal hernia     Social History     Tobacco Use   Smoking Status Never Smoker   Smokeless Tobacco Never Used     Social History     Substance and Sexual Activity   Alcohol Use No    Comment: occasional wine       MEDICATIONS:  Current Outpatient Medications   Medication Sig Dispense Refill    vitamin D (CHOLECALCIFEROL) 1000 UNIT TABS tablet Take 1,000 Units by mouth daily      cetirizine (ZYRTEC) 10 MG tablet Take 10 mg by mouth daily      losartan (COZAAR) 50 MG tablet discharge. Musculoskeletal: Negative for back pain. Neurological: Negative for syncope and headaches. Psychiatric/Behavioral: Negative for behavioral problems. Objective:   Physical Exam  Constitutional:       Appearance: She is well-developed. Eyes:      Pupils: Pupils are equal, round, and reactive to light. Neck:      Thyroid: No thyromegaly. Trachea: No tracheal deviation. Cardiovascular:      Rate and Rhythm: Normal rate and regular rhythm. Heart sounds: Normal heart sounds. Pulmonary:      Effort: Pulmonary effort is normal. No respiratory distress. Breath sounds: Normal breath sounds. Chest:      Breasts: Breasts are symmetrical.         Right: No inverted nipple. Left: No inverted nipple, mass, nipple discharge, skin change or tenderness. Abdominal:      General: Bowel sounds are normal. There is no distension. Palpations: Abdomen is soft. There is no mass. Tenderness: There is no abdominal tenderness. Hernia: There is no hernia in the right inguinal area or left inguinal area. Genitourinary:     Labia:         Right: No rash or lesion. Left: No rash or lesion. Vagina: No vaginal discharge or tenderness. Cervix: No cervical motion tenderness, discharge or friability. Uterus: Not deviated, not enlarged and not fixed. Adnexa:         Right: No mass, tenderness or fullness. Left: No mass, tenderness or fullness. Musculoskeletal:         General: No tenderness. Lymphadenopathy:      Cervical: No cervical adenopathy. Skin:     General: Skin is warm and dry. Neurological:      Mental Status: She is alert and oriented to person, place, and time. Psychiatric:         Behavior: Behavior normal.         Thought Content: Thought content normal.         Judgment: Judgment normal.         Assessment:      Encounter Diagnoses   Name Primary?     Women's annual routine gynecological examination Yes    Encounter for screening mammogram for breast cancer     Family history of breast cancer            Plan:      1. Discussed new pap smear guidelines. 2. Screening mammogram discussed and advised yearly if within normal limits. 3. Calcium and Vitamin D dosing reviewed. 4. Colonoscopy screening reviewed. 5. Bone density testing reviewed.            Heidi Flowers MD

## 2020-06-05 LAB — CYTOLOGY REPORT: NORMAL

## 2020-06-16 ENCOUNTER — HOSPITAL ENCOUNTER (OUTPATIENT)
Dept: MAMMOGRAPHY | Age: 70
Discharge: HOME | End: 2020-06-18
Payer: MEDICARE

## 2020-06-16 PROCEDURE — 77063 BREAST TOMOSYNTHESIS BI: CPT

## 2020-06-18 ENCOUNTER — HOSPITAL ENCOUNTER (OUTPATIENT)
Age: 70
Discharge: HOME | End: 2020-06-18
Payer: MEDICARE

## 2020-06-18 LAB
ABSOLUTE EOS #: 0.36 K/UL (ref 0–0.44)
ABSOLUTE IMMATURE GRANULOCYTE: 0.03 K/UL (ref 0–0.3)
ABSOLUTE LYMPH #: 3.16 K/UL (ref 1.1–3.7)
ABSOLUTE MONO #: 0.78 K/UL (ref 0.1–1.2)
ALBUMIN SERPL-MCNC: 4 G/DL (ref 3.5–5.2)
ALBUMIN/GLOBULIN RATIO: 1.4 (ref 1–2.5)
ALP BLD-CCNC: 115 U/L (ref 35–104)
ALT SERPL-CCNC: 16 U/L (ref 5–33)
ANION GAP SERPL CALCULATED.3IONS-SCNC: 12 MMOL/L (ref 9–17)
AST SERPL-CCNC: 18 U/L
BASOPHILS # BLD: 1 % (ref 0–2)
BASOPHILS ABSOLUTE: 0.13 K/UL (ref 0–0.2)
BILIRUB SERPL-MCNC: <0.1 MG/DL (ref 0.3–1.2)
BUN BLDV-MCNC: 15 MG/DL (ref 8–23)
BUN/CREAT BLD: ABNORMAL (ref 9–20)
CALCIUM SERPL-MCNC: 9.2 MG/DL (ref 8.6–10.4)
CHLORIDE BLD-SCNC: 103 MMOL/L (ref 98–107)
CHOLESTEROL/HDL RATIO: 3.9
CHOLESTEROL: 195 MG/DL
CO2: 25 MMOL/L (ref 20–31)
CREAT SERPL-MCNC: 0.57 MG/DL (ref 0.5–0.9)
DIFFERENTIAL TYPE: NORMAL
EOSINOPHILS RELATIVE PERCENT: 4 % (ref 1–4)
GFR AFRICAN AMERICAN: >60 ML/MIN
GFR NON-AFRICAN AMERICAN: >60 ML/MIN
GFR SERPL CREATININE-BSD FRML MDRD: ABNORMAL ML/MIN/{1.73_M2}
GFR SERPL CREATININE-BSD FRML MDRD: ABNORMAL ML/MIN/{1.73_M2}
GLUCOSE BLD-MCNC: 90 MG/DL (ref 70–99)
HCT VFR BLD CALC: 45.1 % (ref 36.3–47.1)
HDLC SERPL-MCNC: 50 MG/DL
HEMOGLOBIN: 14.4 G/DL (ref 11.9–15.1)
IMMATURE GRANULOCYTES: 0 %
LDL CHOLESTEROL: 107 MG/DL (ref 0–130)
LYMPHOCYTES # BLD: 33 % (ref 24–43)
MCH RBC QN AUTO: 29.8 PG (ref 25.2–33.5)
MCHC RBC AUTO-ENTMCNC: 31.9 G/DL (ref 28.4–34.8)
MCV RBC AUTO: 93.4 FL (ref 82.6–102.9)
MONOCYTES # BLD: 8 % (ref 3–12)
NRBC AUTOMATED: 0 PER 100 WBC
PDW BLD-RTO: 13.4 % (ref 11.8–14.4)
PLATELET # BLD: 309 K/UL (ref 138–453)
PLATELET ESTIMATE: NORMAL
PMV BLD AUTO: 9.4 FL (ref 8.1–13.5)
POTASSIUM SERPL-SCNC: 4.3 MMOL/L (ref 3.7–5.3)
RBC # BLD: 4.83 M/UL (ref 3.95–5.11)
RBC # BLD: NORMAL 10*6/UL
SEG NEUTROPHILS: 54 % (ref 36–65)
SEGMENTED NEUTROPHILS ABSOLUTE COUNT: 5.06 K/UL (ref 1.5–8.1)
SODIUM BLD-SCNC: 140 MMOL/L (ref 135–144)
TOTAL PROTEIN: 6.9 G/DL (ref 6.4–8.3)
TRIGL SERPL-MCNC: 192 MG/DL
VLDLC SERPL CALC-MCNC: ABNORMAL MG/DL (ref 1–30)
WBC # BLD: 9.5 K/UL (ref 3.5–11.3)
WBC # BLD: NORMAL 10*3/UL

## 2020-06-18 PROCEDURE — 85025 COMPLETE CBC W/AUTO DIFF WBC: CPT

## 2020-06-18 PROCEDURE — 80061 LIPID PANEL: CPT

## 2020-06-18 PROCEDURE — 80053 COMPREHEN METABOLIC PANEL: CPT

## 2020-06-18 PROCEDURE — 36415 COLL VENOUS BLD VENIPUNCTURE: CPT

## 2020-07-10 ENCOUNTER — HOSPITAL ENCOUNTER (OUTPATIENT)
Dept: CT IMAGING | Age: 70
Discharge: HOME | End: 2020-07-12
Payer: MEDICARE

## 2020-07-10 PROCEDURE — 74177 CT ABD & PELVIS W/CONTRAST: CPT

## 2020-07-10 PROCEDURE — 2580000003 HC RX 258: Performed by: FAMILY MEDICINE

## 2020-07-10 PROCEDURE — 6360000004 HC RX CONTRAST MEDICATION: Performed by: FAMILY MEDICINE

## 2020-07-10 RX ORDER — SODIUM CHLORIDE 0.9 % (FLUSH) 0.9 %
10 SYRINGE (ML) INJECTION PRN
Status: DISCONTINUED | OUTPATIENT
Start: 2020-07-10 | End: 2020-07-13 | Stop reason: HOSPADM

## 2020-07-10 RX ORDER — 0.9 % SODIUM CHLORIDE 0.9 %
80 INTRAVENOUS SOLUTION INTRAVENOUS ONCE
Status: COMPLETED | OUTPATIENT
Start: 2020-07-10 | End: 2020-07-10

## 2020-07-10 RX ADMIN — SODIUM CHLORIDE 80 ML: 9 INJECTION, SOLUTION INTRAVENOUS at 15:26

## 2020-07-10 RX ADMIN — Medication 10 ML: at 15:26

## 2020-07-10 RX ADMIN — IOHEXOL 50 ML: 240 INJECTION, SOLUTION INTRATHECAL; INTRAVASCULAR; INTRAVENOUS; ORAL at 15:26

## 2020-07-10 RX ADMIN — IOVERSOL 75 ML: 741 INJECTION INTRA-ARTERIAL; INTRAVENOUS at 15:25

## 2021-04-29 ENCOUNTER — HOSPITAL ENCOUNTER (OUTPATIENT)
Age: 71
Discharge: HOME | End: 2021-04-29
Payer: MEDICARE

## 2021-04-29 LAB
ABSOLUTE EOS #: 0.18 K/UL (ref 0–0.44)
ABSOLUTE IMMATURE GRANULOCYTE: 0.03 K/UL (ref 0–0.3)
ABSOLUTE LYMPH #: 2.55 K/UL (ref 1.1–3.7)
ABSOLUTE MONO #: 0.68 K/UL (ref 0.1–1.2)
ALBUMIN SERPL-MCNC: 4.3 G/DL (ref 3.5–5.2)
ALBUMIN/GLOBULIN RATIO: 1.3 (ref 1–2.5)
ALP BLD-CCNC: 109 U/L (ref 35–104)
ALT SERPL-CCNC: 25 U/L (ref 5–33)
ANION GAP SERPL CALCULATED.3IONS-SCNC: 14 MMOL/L (ref 9–17)
AST SERPL-CCNC: 23 U/L
BASOPHILS # BLD: 1 % (ref 0–2)
BASOPHILS ABSOLUTE: 0.09 K/UL (ref 0–0.2)
BILIRUB SERPL-MCNC: 0.25 MG/DL (ref 0.3–1.2)
BUN BLDV-MCNC: 15 MG/DL (ref 8–23)
BUN/CREAT BLD: ABNORMAL (ref 9–20)
CALCIUM SERPL-MCNC: 9.5 MG/DL (ref 8.6–10.4)
CHLORIDE BLD-SCNC: 105 MMOL/L (ref 98–107)
CHOLESTEROL, FASTING: 180 MG/DL
CHOLESTEROL/HDL RATIO: 3.4
CO2: 24 MMOL/L (ref 20–31)
CREAT SERPL-MCNC: 0.68 MG/DL (ref 0.5–0.9)
DIFFERENTIAL TYPE: NORMAL
EOSINOPHILS RELATIVE PERCENT: 2 % (ref 1–4)
GFR AFRICAN AMERICAN: >60 ML/MIN
GFR NON-AFRICAN AMERICAN: >60 ML/MIN
GFR SERPL CREATININE-BSD FRML MDRD: ABNORMAL ML/MIN/{1.73_M2}
GFR SERPL CREATININE-BSD FRML MDRD: ABNORMAL ML/MIN/{1.73_M2}
GLUCOSE FASTING: 90 MG/DL (ref 70–99)
HCT VFR BLD CALC: 46.7 % (ref 36.3–47.1)
HDLC SERPL-MCNC: 53 MG/DL
HEMOGLOBIN: 15 G/DL (ref 11.9–15.1)
IMMATURE GRANULOCYTES: 0 %
LDL CHOLESTEROL: 98 MG/DL (ref 0–130)
LYMPHOCYTES # BLD: 28 % (ref 24–43)
MCH RBC QN AUTO: 30.4 PG (ref 25.2–33.5)
MCHC RBC AUTO-ENTMCNC: 32.1 G/DL (ref 28.4–34.8)
MCV RBC AUTO: 94.5 FL (ref 82.6–102.9)
MONOCYTES # BLD: 8 % (ref 3–12)
NRBC AUTOMATED: 0 PER 100 WBC
PDW BLD-RTO: 13.1 % (ref 11.8–14.4)
PLATELET # BLD: 331 K/UL (ref 138–453)
PLATELET ESTIMATE: NORMAL
PMV BLD AUTO: 9.8 FL (ref 8.1–13.5)
POTASSIUM SERPL-SCNC: 4.6 MMOL/L (ref 3.7–5.3)
RBC # BLD: 4.94 M/UL (ref 3.95–5.11)
RBC # BLD: NORMAL 10*6/UL
SEG NEUTROPHILS: 61 % (ref 36–65)
SEGMENTED NEUTROPHILS ABSOLUTE COUNT: 5.51 K/UL (ref 1.5–8.1)
SODIUM BLD-SCNC: 143 MMOL/L (ref 135–144)
T3 TOTAL: 95 NG/DL (ref 60–181)
THYROXINE, FREE: 1.17 NG/DL (ref 0.93–1.7)
TOTAL PROTEIN: 7.6 G/DL (ref 6.4–8.3)
TRIGLYCERIDE, FASTING: 147 MG/DL
TSH SERPL DL<=0.05 MIU/L-ACNC: 0.78 MIU/L (ref 0.3–5)
VLDLC SERPL CALC-MCNC: NORMAL MG/DL (ref 1–30)
WBC # BLD: 9 K/UL (ref 3.5–11.3)
WBC # BLD: NORMAL 10*3/UL

## 2021-04-29 PROCEDURE — 80053 COMPREHEN METABOLIC PANEL: CPT

## 2021-04-29 PROCEDURE — 84480 ASSAY TRIIODOTHYRONINE (T3): CPT

## 2021-04-29 PROCEDURE — 84439 ASSAY OF FREE THYROXINE: CPT

## 2021-04-29 PROCEDURE — 85025 COMPLETE CBC W/AUTO DIFF WBC: CPT

## 2021-04-29 PROCEDURE — 84443 ASSAY THYROID STIM HORMONE: CPT

## 2021-04-29 PROCEDURE — 80061 LIPID PANEL: CPT

## 2021-04-29 PROCEDURE — 36415 COLL VENOUS BLD VENIPUNCTURE: CPT

## 2021-06-11 DIAGNOSIS — Z12.31 VISIT FOR SCREENING MAMMOGRAM: Primary | ICD-10-CM

## 2021-06-17 ENCOUNTER — HOSPITAL ENCOUNTER (OUTPATIENT)
Dept: MAMMOGRAPHY | Age: 71
Discharge: HOME | End: 2021-06-19
Payer: MEDICARE

## 2021-06-17 DIAGNOSIS — R92.8 ABNORMALITY OF LEFT BREAST ON SCREENING MAMMOGRAM: Primary | ICD-10-CM

## 2021-06-17 DIAGNOSIS — Z12.31 VISIT FOR SCREENING MAMMOGRAM: ICD-10-CM

## 2021-06-17 PROCEDURE — 77063 BREAST TOMOSYNTHESIS BI: CPT

## 2021-06-18 ENCOUNTER — HOSPITAL ENCOUNTER (OUTPATIENT)
Dept: ULTRASOUND IMAGING | Age: 71
Discharge: HOME | End: 2021-06-20
Payer: MEDICARE

## 2021-06-18 DIAGNOSIS — Q61.00 CONGENITAL RENAL CYST OF LEFT KIDNEY: ICD-10-CM

## 2021-06-18 PROCEDURE — 76775 US EXAM ABDO BACK WALL LIM: CPT

## 2021-06-28 ENCOUNTER — HOSPITAL ENCOUNTER (OUTPATIENT)
Dept: MAMMOGRAPHY | Age: 71
Discharge: HOME | End: 2021-06-30
Payer: MEDICARE

## 2021-06-28 ENCOUNTER — HOSPITAL ENCOUNTER (OUTPATIENT)
Dept: ULTRASOUND IMAGING | Age: 71
Discharge: HOME | End: 2021-06-30
Payer: MEDICARE

## 2021-06-28 DIAGNOSIS — R92.8 ABNORMALITY OF LEFT BREAST ON SCREENING MAMMOGRAM: ICD-10-CM

## 2021-06-28 PROCEDURE — G0279 TOMOSYNTHESIS, MAMMO: HCPCS

## 2021-06-28 PROCEDURE — 76642 ULTRASOUND BREAST LIMITED: CPT

## 2021-09-17 ENCOUNTER — HOSPITAL ENCOUNTER (OUTPATIENT)
Age: 71
Discharge: HOME | End: 2021-09-17
Payer: MEDICARE

## 2021-09-17 LAB
ABSOLUTE EOS #: 0.17 K/UL (ref 0–0.44)
ABSOLUTE IMMATURE GRANULOCYTE: <0.03 K/UL (ref 0–0.3)
ABSOLUTE LYMPH #: 2.59 K/UL (ref 1.1–3.7)
ABSOLUTE MONO #: 0.7 K/UL (ref 0.1–1.2)
ALBUMIN SERPL-MCNC: 4.1 G/DL (ref 3.5–5.2)
ALBUMIN/GLOBULIN RATIO: 1.3 (ref 1–2.5)
ALP BLD-CCNC: 119 U/L (ref 35–104)
ALT SERPL-CCNC: 22 U/L (ref 5–33)
ANION GAP SERPL CALCULATED.3IONS-SCNC: 12 MMOL/L (ref 9–17)
AST SERPL-CCNC: 22 U/L
BASOPHILS # BLD: 1 % (ref 0–2)
BASOPHILS ABSOLUTE: 0.09 K/UL (ref 0–0.2)
BILIRUB SERPL-MCNC: 0.35 MG/DL (ref 0.3–1.2)
BUN BLDV-MCNC: 17 MG/DL (ref 8–23)
BUN/CREAT BLD: ABNORMAL (ref 9–20)
CALCIUM SERPL-MCNC: 9.5 MG/DL (ref 8.6–10.4)
CHLORIDE BLD-SCNC: 105 MMOL/L (ref 98–107)
CO2: 23 MMOL/L (ref 20–31)
CREAT SERPL-MCNC: 0.57 MG/DL (ref 0.5–0.9)
DIFFERENTIAL TYPE: NORMAL
EOSINOPHILS RELATIVE PERCENT: 2 % (ref 1–4)
GFR AFRICAN AMERICAN: >60 ML/MIN
GFR NON-AFRICAN AMERICAN: >60 ML/MIN
GFR SERPL CREATININE-BSD FRML MDRD: ABNORMAL ML/MIN/{1.73_M2}
GFR SERPL CREATININE-BSD FRML MDRD: ABNORMAL ML/MIN/{1.73_M2}
GLUCOSE BLD-MCNC: 91 MG/DL (ref 70–99)
HCT VFR BLD CALC: 46.5 % (ref 36.3–47.1)
HEMOGLOBIN: 14.8 G/DL (ref 11.9–15.1)
IMMATURE GRANULOCYTES: 0 %
LYMPHOCYTES # BLD: 30 % (ref 24–43)
MCH RBC QN AUTO: 29.7 PG (ref 25.2–33.5)
MCHC RBC AUTO-ENTMCNC: 31.8 G/DL (ref 28.4–34.8)
MCV RBC AUTO: 93.4 FL (ref 82.6–102.9)
MONOCYTES # BLD: 8 % (ref 3–12)
NRBC AUTOMATED: 0 PER 100 WBC
PDW BLD-RTO: 13 % (ref 11.8–14.4)
PLATELET # BLD: 317 K/UL (ref 138–453)
PLATELET ESTIMATE: NORMAL
PMV BLD AUTO: 9.5 FL (ref 8.1–13.5)
POTASSIUM SERPL-SCNC: 4.3 MMOL/L (ref 3.7–5.3)
RBC # BLD: 4.98 M/UL (ref 3.95–5.11)
RBC # BLD: NORMAL 10*6/UL
SEG NEUTROPHILS: 59 % (ref 36–65)
SEGMENTED NEUTROPHILS ABSOLUTE COUNT: 4.94 K/UL (ref 1.5–8.1)
SODIUM BLD-SCNC: 140 MMOL/L (ref 135–144)
THYROXINE, FREE: 1.14 NG/DL (ref 0.93–1.7)
TOTAL PROTEIN: 7.2 G/DL (ref 6.4–8.3)
TSH SERPL DL<=0.05 MIU/L-ACNC: 0.94 MIU/L (ref 0.3–5)
WBC # BLD: 8.5 K/UL (ref 3.5–11.3)
WBC # BLD: NORMAL 10*3/UL

## 2021-09-17 PROCEDURE — 82784 ASSAY IGA/IGD/IGG/IGM EACH: CPT

## 2021-09-17 PROCEDURE — 83516 IMMUNOASSAY NONANTIBODY: CPT

## 2021-09-17 PROCEDURE — 87329 GIARDIA AG IA: CPT

## 2021-09-17 PROCEDURE — 84443 ASSAY THYROID STIM HORMONE: CPT

## 2021-09-17 PROCEDURE — 87328 CRYPTOSPORIDIUM AG IA: CPT

## 2021-09-17 PROCEDURE — 84439 ASSAY OF FREE THYROXINE: CPT

## 2021-09-17 PROCEDURE — 87324 CLOSTRIDIUM AG IA: CPT

## 2021-09-17 PROCEDURE — 85025 COMPLETE CBC W/AUTO DIFF WBC: CPT

## 2021-09-17 PROCEDURE — 80053 COMPREHEN METABOLIC PANEL: CPT

## 2021-09-17 PROCEDURE — 87449 NOS EACH ORGANISM AG IA: CPT

## 2021-09-17 PROCEDURE — 36415 COLL VENOUS BLD VENIPUNCTURE: CPT

## 2021-09-19 LAB
GLIADIN DEAMINIDATED PEPTIDE AB IGA: 0.7 U/ML
GLIADIN DEAMINIDATED PEPTIDE AB IGG: 0.4 U/ML
IGA: 372 MG/DL (ref 70–400)
TISSUE TRANSGLUTAMINASE IGA: 1.9 U/ML

## 2021-09-20 LAB
DIRECT EXAM: NORMAL
DIRECT EXAM: NORMAL
Lab: NORMAL
SPECIMEN DESCRIPTION: NORMAL

## 2022-06-13 ENCOUNTER — OFFICE VISIT (OUTPATIENT)
Dept: OBGYN CLINIC | Age: 72
End: 2022-06-13
Payer: MEDICARE

## 2022-06-13 VITALS
HEART RATE: 69 BPM | DIASTOLIC BLOOD PRESSURE: 75 MMHG | BODY MASS INDEX: 31.58 KG/M2 | SYSTOLIC BLOOD PRESSURE: 154 MMHG | WEIGHT: 189.8 LBS

## 2022-06-13 DIAGNOSIS — Z12.31 ENCOUNTER FOR SCREENING MAMMOGRAM FOR BREAST CANCER: ICD-10-CM

## 2022-06-13 DIAGNOSIS — Z80.3 FAMILY HISTORY OF MALIGNANT NEOPLASM OF BREAST IN FIRST DEGREE RELATIVE: ICD-10-CM

## 2022-06-13 DIAGNOSIS — Z01.419 ENCOUNTER FOR WELL WOMAN EXAM: Primary | ICD-10-CM

## 2022-06-13 DIAGNOSIS — Z80.41 FAMILY HISTORY OF MALIGNANT NEOPLASM OF OVARY IN FIRST DEGREE RELATIVE: ICD-10-CM

## 2022-06-13 PROBLEM — M79.7 FIBROMYALGIA: Status: ACTIVE | Noted: 2022-06-13

## 2022-06-13 PROCEDURE — G0101 CA SCREEN;PELVIC/BREAST EXAM: HCPCS | Performed by: STUDENT IN AN ORGANIZED HEALTH CARE EDUCATION/TRAINING PROGRAM

## 2022-06-13 NOTE — PROGRESS NOTES
[facility] Obstetrics and Gynecology  [address] [facility], [address] [place] [address]      Lucian Navarro  2022                       Primary Care Physician: Elma Bro MD    CC:   Chief Complaint   Patient presents with    Annual Exam         HPI: Lucian Navarro is a 73 y.o. female  No LMP recorded. Patient is postmenopausal.    The patient was seen and examined. She is here for an annual visit. She is complaining of nothing. Patient is menopausal and denies any bleeding since that time. Her bowel habits are regular. She denies any bloating. She denies dysuria. She denies urinary leaking. She denies vaginal discharge. She is sexually active with single partner, contraception - tubal ligation.      Depression Screen: Symptoms of decreased mood absent  Symptoms of anhedonia absent  **If either question is answered in a  positive fashion then complete the PHQ9 Scoring Evaluation and make the appropriate referral**    REVIEW OF SYSTEMS:   Constitutional: negative fever, negative chills  HEENT: negative visual disturbances, negative headaches  Respiratory: negative dyspnea, negative cough  Cardiovascular: negative chest pain,  negative palpitations  Gastrointestinal: negative abdominal pain, negative RUQ pain, negative N/V, negative diarrhea, negative constipation  Genitourinary: negative dysuria, negative vaginal discharge  Dermatological: negative rash  Hematologic: negative bruising  Immunologic/Lymphatic: negative recent illness, negative recent sick contact  Musculoskeletal: negative back pain, negative myalgias, negative arthralgias  Neurological:  negative dizziness, negative weakness  Behavior/Psych: negative depression, negative anxiety      GYNECOLOGICAL HISTORY:  Age of Menarche: 17  Age of Menopause: 48     STD History: no past history    Permanent Sterilization: yes - Tubal ligation  Reversible Birth Control: no  Hormone Replacement Exposure: yes - Estrace cream Hx    OBSTETRICAL HISTORY:  OB History    Para Term  AB Living   5 4 2 0 1 0   SAB IAB Ectopic Molar Multiple Live Births   0 0 1 0 0 0      # Outcome Date GA Lbr Karan/2nd Weight Sex Delivery Anes PTL Lv   5 Term            4 Term            3 Ectopic            2 Para     M CS-LTranv      1 Para     M CS-LTranv          PAST MEDICAL HISTORY:   has a past medical history of Arthritis, Asthma, Chest pain, Esophageal spasm, Fibromyalgia, Frequent UTI, Headache, Hiatal hernia, HTN (hypertension), Nerve damage, Osteopenia, PONV (postoperative nausea and vomiting), Seizure ([address] and Wears glasses. PAST SURGICAL HISTORY:   has a past surgical history that includes Tubal ligation (); Ectopic pregnancy surgery ();  section, low transverse (); Appendectomy; Bladder surgery; back surgery; Knee arthroscopy (10/10); Knee arthroscopy (); Colonoscopy; Endoscopy, colon, diagnostic; Acromioplasty (Left, 2/3/16); Cholecystectomy (); and joint replacement (2019). ALLERGIES:  is allergic to nitroglycerin, oxycodone, oxycodone-acetaminophen, penicillin g, sulfamethizole, macrodantin [nitrofurantoin], pcn [penicillins], and sulfa antibiotics. MEDICATIONS:  Prior to Admission medications    Medication Sig Start Date End Date Taking? Authorizing Provider   vitamin D (CHOLECALCIFEROL) 1000 UNIT TABS tablet Take 1,000 Units by mouth daily   Yes Historical Provider, MD   cetirizine (ZYRTEC) 10 MG tablet Take 10 mg by mouth daily   Yes Historical Provider, MD   losartan (COZAAR) 50 MG tablet Take 50 mg by mouth daily. Yes Historical Provider, MD   omeprazole (PRILOSEC) 40 MG capsule Take 40 mg by mouth daily. Yes Historical Provider, MD   montelukast (SINGULAIR) 10 MG tablet Take 10 mg by mouth nightly. Yes Historical Provider, MD   fluticasone-salmeterol (ADVAIR DISKUS) 250-50 MCG/DOSE AEPB Inhale 1 puff into the lungs every 12 hours.      Yes Historical Provider, MD albuterol (PROVENTIL HFA;VENTOLIN HFA) 108 (90 BASE) MCG/ACT inhaler Inhale 2 puffs into the lungs every 6 hours as needed. Yes Historical Provider, MD   Calcium Carbonate-Vitamin D (CALCIUM + D) 600-200 MG-UNIT TABS Take  by mouth. Yes Historical Provider, MD   therapeutic multivitamin-minerals (THERAGRAN-M) tablet Take 1 tablet by mouth daily. Yes Historical Provider, MD   frovatriptan succinate (FROVA) 2.5 MG TABS Take 2.5 mg by mouth as needed. Yes Historical Provider, MD   estradiol (ESTRACE VAGINAL) 0.1 MG/GM vaginal cream Place 1 g vaginally daily  Patient not taking: Reported on 6/1/2020 4/20/17   Cleve Nunes MD       FAMILY HISTORY:  Family History of Breast, Ovarian, Colon or Uterine Cancer: Yes Breast CA sister/mother ovarian cancer in sister  family history includes Breast Cancer in her sister; Breast Cancer (age of onset: [de-identified]) in her mother; COPD in her sister; Cancer in her brother; Cancer (age of onset: 80) in her mother; Diabetes in her sister and sister; Heart Attack in her brother; Heart Disease in her father; Hypertension in her sister and sister; Osteoporosis in her mother; Other in her brother, grandchild, and son; Ovarian Cancer (age of onset: 68) in her sister. SOCIAL HISTORY:   reports that she has never smoked. She has never used smokeless tobacco. She reports that she does not drink alcohol and does not use drugs. HEALTH MAINTENANCE:  Immunization status: stated as up to date, no records available    [address] [place] [address]: BIRADS 2 6/21. Next due now. Colonoscopy: 4/2015. Next due 2025. Pap Smears: Last 6/20 Neg. Discontinued due to age. Family Planning: Tubal/menopausal  DEXA: Normal 6/19. Next due 6/24.     VITALS:  Vitals:    06/13/22 1028 06/13/22 1100   BP: (!) 145/83 (!) 154/75   Site: Right Upper Arm Right Upper Arm   Position: Sitting Sitting   Cuff Size: Large Adult Large Adult   Pulse: 68 69   Weight: 189 lb 12.8 oz (86.1 kg) PHYSICAL EXAM:   General Appearance: Appears healthy. Alert; in no acute distress. Pleasant. Skin: Skin color, texture, turgor normal. No rashes or lesions. HEENT: normocephalic and atraumatic, Thyroid normal to inspection and palpation  Respiratory: Normal expansion. Clear to auscultation. No rales, rhonchi, or wheezing. Cardiovascular: normal rate, normal S1 and S2, no gallops, intact distal pulses and no carotid bruits  Breast:  (Chest): normal appearance, no masses or tenderness  Abdomen: soft, non-tender, non-distended, no right upper quadrant tenderness and no CVA tenderness  Pelvic Exam:   External genitalia: General appearance; normal, Hair distribution; normal, Lesions absent  Urinary system: urethral meatus normal  Vaginal: normal mucosa, no discharge  Cervix: normal appearing cervix without discharge or lesions  Adnexa: non-palpable  Uterus: normal single, nontender  Rectal Exam: exam declined by patient  Musculoskeletal: no gross abnormalities  Extremities: non-tender BLE and non-edematous  Psych:  oriented to time, place and person     DATA:  No results found for this visit on 22. ASSESSMENT & PLAN:    Danyel Montenegro is a 73 y.o. female  No LMP recorded. Patient is postmenopausal.    Annual:   Mammogram: BIRADS 2 . Next due now. Colonoscopy: 2015. Next due . Pap Smears: Last  Neg. Discontinued due to age. Family Planning: Tubal/menopausal   DEXA: Normal . Ordered by PCP. Elevated BP   - Follow up with PCP     FHx Ovarian/Breast Cancer   - In Sister   - Genetic Counseling referral made.     Patient Active Problem List    Diagnosis Date Noted    Fibromyalgia 2022     Priority: Medium    Precordial pain 2016    Chest pain     Asthma     Frequent UTI     Hypertension     Hiatal hernia     Seizure ([facility] [address]        Return in about 1 year (around 6/13/2023) for Annual.  No Patient Care Coordination Note on file. Counseling Completed:    Discussed need for repeat pap as per American Society for Colposcopy and Cervical Pathology guidelines. Discussed need for mammograms every 1 year, If >44 yo and last mammogram was negative. Discussed Calcium and Vitamin D dosing. Discussed need for colonoscopy screening as well as onset for bone density testing. Discussed birth control and barrier recommendations. Discussed STD counseling and prevention. Discussed Gardisil counseling for all patients 10-35 yo. Hereditary Breast, Ovarian, Colon and Uterine Cancer screening discussed. Tobacco & Secondary smoke risks discussed; with recommendation for cessation and avoidance. Routine health maintenance per patients PCP discussed. Diagnosis Orders   1. Encounter for well woman exam     2. Encounter for screening mammogram for breast cancer  SILAS Screening Bilateral   3. Family history of malignant neoplasm of breast in first degree relative     4.  Family history of malignant neoplasm of ovary in first degree relative          Pete Lamb 27, [address]  6/13/2022, 11:23 AM

## 2022-06-14 ENCOUNTER — HOSPITAL ENCOUNTER (OUTPATIENT)
Dept: GENERAL RADIOLOGY | Age: 72
Discharge: HOME | End: 2022-06-16
Payer: MEDICARE

## 2022-06-14 ENCOUNTER — HOSPITAL ENCOUNTER (OUTPATIENT)
Age: 72
Discharge: HOME | End: 2022-06-16
Payer: MEDICARE

## 2022-06-14 DIAGNOSIS — J45.21 MILD INTERMITTENT ASTHMA WITH EXACERBATION: ICD-10-CM

## 2022-06-14 PROCEDURE — 71046 X-RAY EXAM CHEST 2 VIEWS: CPT

## 2022-07-01 ENCOUNTER — HOSPITAL ENCOUNTER (OUTPATIENT)
Dept: MAMMOGRAPHY | Age: 72
Discharge: HOME | End: 2022-07-03
Payer: MEDICARE

## 2022-07-01 VITALS — HEIGHT: 65 IN | WEIGHT: 189 LBS | BODY MASS INDEX: 31.49 KG/M2

## 2022-07-01 DIAGNOSIS — Z12.31 ENCOUNTER FOR SCREENING MAMMOGRAM FOR BREAST CANCER: ICD-10-CM

## 2022-07-01 PROCEDURE — 77063 BREAST TOMOSYNTHESIS BI: CPT

## 2022-07-06 ENCOUNTER — HOSPITAL ENCOUNTER (OUTPATIENT)
Dept: CT IMAGING | Age: 72
Discharge: HOME | End: 2022-07-08
Payer: MEDICARE

## 2022-07-06 DIAGNOSIS — J45.909 UNCOMPLICATED ASTHMA, UNSPECIFIED ASTHMA SEVERITY, UNSPECIFIED WHETHER PERSISTENT: ICD-10-CM

## 2022-07-06 PROCEDURE — 71250 CT THORAX DX C-: CPT

## 2022-07-16 ENCOUNTER — HOSPITAL ENCOUNTER (OUTPATIENT)
Age: 72
Setting detail: OBSERVATION
Discharge: HOME | End: 2022-07-18
Attending: EMERGENCY MEDICINE | Admitting: STUDENT IN AN ORGANIZED HEALTH CARE EDUCATION/TRAINING PROGRAM
Payer: MEDICARE

## 2022-07-16 ENCOUNTER — APPOINTMENT (OUTPATIENT)
Dept: GENERAL RADIOLOGY | Age: 72
End: 2022-07-16
Payer: MEDICARE

## 2022-07-16 DIAGNOSIS — R07.9 CHEST PAIN, UNSPECIFIED TYPE: ICD-10-CM

## 2022-07-16 DIAGNOSIS — R00.2 PALPITATIONS: Primary | ICD-10-CM

## 2022-07-16 DIAGNOSIS — I48.0 PAROXYSMAL ATRIAL FIBRILLATION (HCC): ICD-10-CM

## 2022-07-16 DIAGNOSIS — I77.810 ASCENDING AORTA DILATATION (HCC): ICD-10-CM

## 2022-07-16 LAB
ABSOLUTE EOS #: 0.4 K/UL (ref 0–0.4)
ABSOLUTE LYMPH #: 4.5 K/UL (ref 1–4.8)
ABSOLUTE MONO #: 1.1 K/UL (ref 0.1–1.2)
ANION GAP SERPL CALCULATED.3IONS-SCNC: 11 MMOL/L (ref 9–17)
BASOPHILS # BLD: 1 % (ref 0–2)
BASOPHILS ABSOLUTE: 0.1 K/UL (ref 0–0.2)
BUN BLDV-MCNC: 17 MG/DL (ref 8–23)
CALCIUM SERPL-MCNC: 9.8 MG/DL (ref 8.6–10.4)
CHLORIDE BLD-SCNC: 103 MMOL/L (ref 98–107)
CO2: 26 MMOL/L (ref 20–31)
CREAT SERPL-MCNC: 0.56 MG/DL (ref 0.5–0.9)
EOSINOPHILS RELATIVE PERCENT: 3 % (ref 1–4)
GFR AFRICAN AMERICAN: >60 ML/MIN
GFR NON-AFRICAN AMERICAN: >60 ML/MIN
GFR SERPL CREATININE-BSD FRML MDRD: ABNORMAL ML/MIN/{1.73_M2}
GLUCOSE BLD-MCNC: 106 MG/DL (ref 70–99)
HCT VFR BLD CALC: 45.1 % (ref 36–46)
HEMOGLOBIN: 15.3 G/DL (ref 12–16)
INR BLD: 0.9
LYMPHOCYTES # BLD: 37 % (ref 24–44)
MCH RBC QN AUTO: 31.2 PG (ref 26–34)
MCHC RBC AUTO-ENTMCNC: 34 G/DL (ref 31–37)
MCV RBC AUTO: 91.7 FL (ref 80–100)
MONOCYTES # BLD: 9 % (ref 2–11)
PDW BLD-RTO: 13.2 % (ref 12.5–15.4)
PLATELET # BLD: 299 K/UL (ref 140–450)
PMV BLD AUTO: 7.3 FL (ref 6–12)
POTASSIUM SERPL-SCNC: 4 MMOL/L (ref 3.7–5.3)
PROTHROMBIN TIME: 9.5 SEC (ref 9.4–12.6)
RBC # BLD: 4.92 M/UL (ref 4–5.2)
SEG NEUTROPHILS: 50 % (ref 36–66)
SEGMENTED NEUTROPHILS ABSOLUTE COUNT: 6.3 K/UL (ref 1.8–7.7)
SODIUM BLD-SCNC: 140 MMOL/L (ref 135–144)
TROPONIN, HIGH SENSITIVITY: 10 NG/L (ref 0–14)
WBC # BLD: 12.3 K/UL (ref 3.5–11)

## 2022-07-16 PROCEDURE — 93005 ELECTROCARDIOGRAM TRACING: CPT | Performed by: EMERGENCY MEDICINE

## 2022-07-16 PROCEDURE — 80048 BASIC METABOLIC PNL TOTAL CA: CPT

## 2022-07-16 PROCEDURE — 71045 X-RAY EXAM CHEST 1 VIEW: CPT

## 2022-07-16 PROCEDURE — 96375 TX/PRO/DX INJ NEW DRUG ADDON: CPT

## 2022-07-16 PROCEDURE — 85610 PROTHROMBIN TIME: CPT

## 2022-07-16 PROCEDURE — 85025 COMPLETE CBC W/AUTO DIFF WBC: CPT

## 2022-07-16 PROCEDURE — 96374 THER/PROPH/DIAG INJ IV PUSH: CPT

## 2022-07-16 PROCEDURE — 84484 ASSAY OF TROPONIN QUANT: CPT

## 2022-07-16 PROCEDURE — 6370000000 HC RX 637 (ALT 250 FOR IP): Performed by: EMERGENCY MEDICINE

## 2022-07-16 PROCEDURE — 2500000003 HC RX 250 WO HCPCS: Performed by: EMERGENCY MEDICINE

## 2022-07-16 PROCEDURE — 99285 EMERGENCY DEPT VISIT HI MDM: CPT

## 2022-07-16 RX ORDER — ASPIRIN 81 MG/1
81 TABLET, CHEWABLE ORAL ONCE
Status: COMPLETED | OUTPATIENT
Start: 2022-07-16 | End: 2022-07-16

## 2022-07-16 RX ORDER — METOPROLOL TARTRATE 5 MG/5ML
5 INJECTION INTRAVENOUS ONCE
Status: COMPLETED | OUTPATIENT
Start: 2022-07-16 | End: 2022-07-16

## 2022-07-16 RX ORDER — 0.9 % SODIUM CHLORIDE 0.9 %
1000 INTRAVENOUS SOLUTION INTRAVENOUS ONCE
Status: COMPLETED | OUTPATIENT
Start: 2022-07-17 | End: 2022-07-17

## 2022-07-16 RX ORDER — ONDANSETRON 2 MG/ML
4 INJECTION INTRAMUSCULAR; INTRAVENOUS ONCE
Status: COMPLETED | OUTPATIENT
Start: 2022-07-17 | End: 2022-07-17

## 2022-07-16 RX ORDER — MORPHINE SULFATE 2 MG/ML
1 INJECTION, SOLUTION INTRAMUSCULAR; INTRAVENOUS ONCE
Status: DISCONTINUED | OUTPATIENT
Start: 2022-07-16 | End: 2022-07-18 | Stop reason: HOSPADM

## 2022-07-16 RX ADMIN — ASPIRIN 81 MG CHEWABLE TABLET 81 MG: 81 TABLET CHEWABLE at 22:52

## 2022-07-16 RX ADMIN — METOPROLOL TARTRATE 5 MG: 5 INJECTION INTRAVENOUS at 22:51

## 2022-07-16 ASSESSMENT — PAIN DESCRIPTION - LOCATION: LOCATION: CHEST

## 2022-07-16 ASSESSMENT — ENCOUNTER SYMPTOMS
RESPIRATORY NEGATIVE: 1
GASTROINTESTINAL NEGATIVE: 1
ALLERGIC/IMMUNOLOGIC NEGATIVE: 1
EYES NEGATIVE: 1

## 2022-07-16 ASSESSMENT — PAIN DESCRIPTION - ORIENTATION: ORIENTATION: LEFT

## 2022-07-16 ASSESSMENT — PAIN SCALES - GENERAL: PAINLEVEL_OUTOF10: 4

## 2022-07-16 ASSESSMENT — PAIN - FUNCTIONAL ASSESSMENT: PAIN_FUNCTIONAL_ASSESSMENT: 0-10

## 2022-07-17 PROBLEM — I48.91 ATRIAL FIBRILLATION WITH RVR (HCC): Status: ACTIVE | Noted: 2022-07-17

## 2022-07-17 PROBLEM — I48.91 ATRIAL FIBRILLATION WITH RAPID VENTRICULAR RESPONSE (HCC): Status: ACTIVE | Noted: 2022-07-17

## 2022-07-17 LAB
ALBUMIN SERPL-MCNC: 4.3 G/DL (ref 3.5–5.2)
ALBUMIN/GLOBULIN RATIO: 1.8 (ref 1–2.5)
ALP BLD-CCNC: 113 U/L (ref 35–104)
ALT SERPL-CCNC: 21 U/L (ref 5–33)
ANION GAP SERPL CALCULATED.3IONS-SCNC: 11 MMOL/L (ref 9–17)
AST SERPL-CCNC: 22 U/L
BILIRUB SERPL-MCNC: 0.11 MG/DL (ref 0.3–1.2)
BUN BLDV-MCNC: 16 MG/DL (ref 8–23)
CALCIUM SERPL-MCNC: 9.5 MG/DL (ref 8.6–10.4)
CHLORIDE BLD-SCNC: 107 MMOL/L (ref 98–107)
CO2: 23 MMOL/L (ref 20–31)
CREAT SERPL-MCNC: 0.46 MG/DL (ref 0.5–0.9)
GFR AFRICAN AMERICAN: >60 ML/MIN
GFR NON-AFRICAN AMERICAN: >60 ML/MIN
GFR SERPL CREATININE-BSD FRML MDRD: ABNORMAL ML/MIN/{1.73_M2}
GLUCOSE BLD-MCNC: 122 MG/DL (ref 70–99)
MAGNESIUM: 2.2 MG/DL (ref 1.6–2.6)
POTASSIUM SERPL-SCNC: 4.3 MMOL/L (ref 3.7–5.3)
SODIUM BLD-SCNC: 141 MMOL/L (ref 135–144)
TOTAL PROTEIN: 6.7 G/DL (ref 6.4–8.3)
TROPONIN, HIGH SENSITIVITY: 12 NG/L (ref 0–14)
TROPONIN, HIGH SENSITIVITY: 14 NG/L (ref 0–14)
TSH SERPL DL<=0.05 MIU/L-ACNC: 0.55 UIU/ML (ref 0.3–5)

## 2022-07-17 PROCEDURE — 2580000003 HC RX 258: Performed by: EMERGENCY MEDICINE

## 2022-07-17 PROCEDURE — 6360000002 HC RX W HCPCS: Performed by: EMERGENCY MEDICINE

## 2022-07-17 PROCEDURE — G0378 HOSPITAL OBSERVATION PER HR: HCPCS

## 2022-07-17 PROCEDURE — 36415 COLL VENOUS BLD VENIPUNCTURE: CPT

## 2022-07-17 PROCEDURE — 83735 ASSAY OF MAGNESIUM: CPT

## 2022-07-17 PROCEDURE — 6370000000 HC RX 637 (ALT 250 FOR IP): Performed by: INTERNAL MEDICINE

## 2022-07-17 PROCEDURE — 93005 ELECTROCARDIOGRAM TRACING: CPT | Performed by: EMERGENCY MEDICINE

## 2022-07-17 PROCEDURE — 99218 PR INITIAL OBSERVATION CARE/DAY 30 MINUTES: CPT | Performed by: STUDENT IN AN ORGANIZED HEALTH CARE EDUCATION/TRAINING PROGRAM

## 2022-07-17 PROCEDURE — 6360000002 HC RX W HCPCS: Performed by: NURSE PRACTITIONER

## 2022-07-17 PROCEDURE — 6370000000 HC RX 637 (ALT 250 FOR IP): Performed by: STUDENT IN AN ORGANIZED HEALTH CARE EDUCATION/TRAINING PROGRAM

## 2022-07-17 PROCEDURE — 84484 ASSAY OF TROPONIN QUANT: CPT

## 2022-07-17 PROCEDURE — 6360000002 HC RX W HCPCS: Performed by: STUDENT IN AN ORGANIZED HEALTH CARE EDUCATION/TRAINING PROGRAM

## 2022-07-17 PROCEDURE — 93005 ELECTROCARDIOGRAM TRACING: CPT | Performed by: INTERNAL MEDICINE

## 2022-07-17 PROCEDURE — 84443 ASSAY THYROID STIM HORMONE: CPT

## 2022-07-17 PROCEDURE — 96375 TX/PRO/DX INJ NEW DRUG ADDON: CPT

## 2022-07-17 PROCEDURE — 80053 COMPREHEN METABOLIC PANEL: CPT

## 2022-07-17 PROCEDURE — 96372 THER/PROPH/DIAG INJ SC/IM: CPT

## 2022-07-17 PROCEDURE — 2580000003 HC RX 258: Performed by: NURSE PRACTITIONER

## 2022-07-17 PROCEDURE — 94640 AIRWAY INHALATION TREATMENT: CPT

## 2022-07-17 PROCEDURE — 94760 N-INVAS EAR/PLS OXIMETRY 1: CPT

## 2022-07-17 PROCEDURE — 6370000000 HC RX 637 (ALT 250 FOR IP): Performed by: NURSE PRACTITIONER

## 2022-07-17 RX ORDER — ENOXAPARIN SODIUM 100 MG/ML
1 INJECTION SUBCUTANEOUS 2 TIMES DAILY
Status: DISCONTINUED | OUTPATIENT
Start: 2022-07-17 | End: 2022-07-18

## 2022-07-17 RX ORDER — MONTELUKAST SODIUM 10 MG/1
10 TABLET ORAL NIGHTLY
Status: DISCONTINUED | OUTPATIENT
Start: 2022-07-17 | End: 2022-07-18 | Stop reason: HOSPADM

## 2022-07-17 RX ORDER — DIPHENHYDRAMINE HCL 25 MG
25 TABLET ORAL ONCE
Status: COMPLETED | OUTPATIENT
Start: 2022-07-17 | End: 2022-07-17

## 2022-07-17 RX ORDER — KETOROLAC TROMETHAMINE 15 MG/ML
15 INJECTION, SOLUTION INTRAMUSCULAR; INTRAVENOUS ONCE
Status: COMPLETED | OUTPATIENT
Start: 2022-07-17 | End: 2022-07-17

## 2022-07-17 RX ORDER — POLYETHYLENE GLYCOL 3350 17 G/17G
17 POWDER, FOR SOLUTION ORAL DAILY PRN
Status: DISCONTINUED | OUTPATIENT
Start: 2022-07-17 | End: 2022-07-18 | Stop reason: HOSPADM

## 2022-07-17 RX ORDER — VITAMIN B COMPLEX
1000 TABLET ORAL DAILY
Status: DISCONTINUED | OUTPATIENT
Start: 2022-07-17 | End: 2022-07-18 | Stop reason: HOSPADM

## 2022-07-17 RX ORDER — CETIRIZINE HYDROCHLORIDE 10 MG/1
10 TABLET ORAL DAILY
Status: DISCONTINUED | OUTPATIENT
Start: 2022-07-17 | End: 2022-07-18 | Stop reason: HOSPADM

## 2022-07-17 RX ORDER — SODIUM CHLORIDE 9 MG/ML
INJECTION, SOLUTION INTRAVENOUS PRN
Status: DISCONTINUED | OUTPATIENT
Start: 2022-07-17 | End: 2022-07-18 | Stop reason: HOSPADM

## 2022-07-17 RX ORDER — ONDANSETRON 2 MG/ML
4 INJECTION INTRAMUSCULAR; INTRAVENOUS EVERY 6 HOURS PRN
Status: DISCONTINUED | OUTPATIENT
Start: 2022-07-17 | End: 2022-07-18 | Stop reason: HOSPADM

## 2022-07-17 RX ORDER — LOSARTAN POTASSIUM 50 MG/1
50 TABLET ORAL DAILY
Status: DISCONTINUED | OUTPATIENT
Start: 2022-07-17 | End: 2022-07-18 | Stop reason: HOSPADM

## 2022-07-17 RX ORDER — PROCHLORPERAZINE EDISYLATE 5 MG/ML
5 INJECTION INTRAMUSCULAR; INTRAVENOUS ONCE
Status: COMPLETED | OUTPATIENT
Start: 2022-07-17 | End: 2022-07-17

## 2022-07-17 RX ORDER — SODIUM CHLORIDE 0.9 % (FLUSH) 0.9 %
10 SYRINGE (ML) INJECTION PRN
Status: DISCONTINUED | OUTPATIENT
Start: 2022-07-17 | End: 2022-07-18 | Stop reason: HOSPADM

## 2022-07-17 RX ORDER — IBUPROFEN 200 MG
400 TABLET ORAL ONCE
Status: COMPLETED | OUTPATIENT
Start: 2022-07-17 | End: 2022-07-17

## 2022-07-17 RX ORDER — DEXAMETHASONE SODIUM PHOSPHATE 10 MG/ML
10 INJECTION INTRAMUSCULAR; INTRAVENOUS ONCE
Status: COMPLETED | OUTPATIENT
Start: 2022-07-17 | End: 2022-07-17

## 2022-07-17 RX ORDER — SODIUM CHLORIDE 0.9 % (FLUSH) 0.9 %
5-40 SYRINGE (ML) INJECTION EVERY 12 HOURS SCHEDULED
Status: DISCONTINUED | OUTPATIENT
Start: 2022-07-17 | End: 2022-07-18 | Stop reason: HOSPADM

## 2022-07-17 RX ORDER — BUDESONIDE AND FORMOTEROL FUMARATE DIHYDRATE 80; 4.5 UG/1; UG/1
1 AEROSOL RESPIRATORY (INHALATION) 2 TIMES DAILY
Status: DISCONTINUED | OUTPATIENT
Start: 2022-07-17 | End: 2022-07-18 | Stop reason: HOSPADM

## 2022-07-17 RX ORDER — PANTOPRAZOLE SODIUM 40 MG/1
40 TABLET, DELAYED RELEASE ORAL
Status: DISCONTINUED | OUTPATIENT
Start: 2022-07-17 | End: 2022-07-18 | Stop reason: HOSPADM

## 2022-07-17 RX ORDER — ALBUTEROL SULFATE 90 UG/1
2 AEROSOL, METERED RESPIRATORY (INHALATION) EVERY 6 HOURS PRN
Status: DISCONTINUED | OUTPATIENT
Start: 2022-07-17 | End: 2022-07-18 | Stop reason: HOSPADM

## 2022-07-17 RX ORDER — ACETAMINOPHEN 325 MG/1
650 TABLET ORAL EVERY 6 HOURS PRN
Status: DISCONTINUED | OUTPATIENT
Start: 2022-07-17 | End: 2022-07-18 | Stop reason: HOSPADM

## 2022-07-17 RX ORDER — ONDANSETRON 4 MG/1
4 TABLET, ORALLY DISINTEGRATING ORAL EVERY 8 HOURS PRN
Status: DISCONTINUED | OUTPATIENT
Start: 2022-07-17 | End: 2022-07-18 | Stop reason: HOSPADM

## 2022-07-17 RX ORDER — ACETAMINOPHEN 650 MG/1
650 SUPPOSITORY RECTAL EVERY 6 HOURS PRN
Status: DISCONTINUED | OUTPATIENT
Start: 2022-07-17 | End: 2022-07-18 | Stop reason: HOSPADM

## 2022-07-17 RX ADMIN — DILTIAZEM HYDROCHLORIDE 30 MG: 30 TABLET, FILM COATED ORAL at 11:13

## 2022-07-17 RX ADMIN — PROCHLORPERAZINE EDISYLATE 5 MG: 5 INJECTION INTRAMUSCULAR; INTRAVENOUS at 17:49

## 2022-07-17 RX ADMIN — Medication 1 PUFF: at 19:43

## 2022-07-17 RX ADMIN — LOSARTAN POTASSIUM 50 MG: 50 TABLET, FILM COATED ORAL at 08:46

## 2022-07-17 RX ADMIN — SODIUM CHLORIDE 1000 ML: 9 INJECTION, SOLUTION INTRAVENOUS at 00:08

## 2022-07-17 RX ADMIN — PANTOPRAZOLE SODIUM 40 MG: 40 TABLET, DELAYED RELEASE ORAL at 11:13

## 2022-07-17 RX ADMIN — DILTIAZEM HYDROCHLORIDE 30 MG: 30 TABLET, FILM COATED ORAL at 20:39

## 2022-07-17 RX ADMIN — ENOXAPARIN SODIUM 90 MG: 100 INJECTION SUBCUTANEOUS at 17:17

## 2022-07-17 RX ADMIN — KETOROLAC TROMETHAMINE 15 MG: 15 INJECTION, SOLUTION INTRAMUSCULAR; INTRAVENOUS at 00:49

## 2022-07-17 RX ADMIN — DIPHENHYDRAMINE HYDROCHLORIDE 25 MG: 25 TABLET ORAL at 17:49

## 2022-07-17 RX ADMIN — ACETAMINOPHEN 650 MG: 325 TABLET ORAL at 11:24

## 2022-07-17 RX ADMIN — ACETAMINOPHEN 650 MG: 325 TABLET ORAL at 05:32

## 2022-07-17 RX ADMIN — SODIUM CHLORIDE, PRESERVATIVE FREE 10 ML: 5 INJECTION INTRAVENOUS at 20:30

## 2022-07-17 RX ADMIN — Medication 1000 UNITS: at 08:46

## 2022-07-17 RX ADMIN — IBUPROFEN 400 MG: 200 TABLET, FILM COATED ORAL at 15:07

## 2022-07-17 RX ADMIN — SODIUM CHLORIDE, PRESERVATIVE FREE 10 ML: 5 INJECTION INTRAVENOUS at 08:46

## 2022-07-17 RX ADMIN — ONDANSETRON 4 MG: 2 INJECTION INTRAMUSCULAR; INTRAVENOUS at 00:07

## 2022-07-17 RX ADMIN — ENOXAPARIN SODIUM 90 MG: 100 INJECTION SUBCUTANEOUS at 05:27

## 2022-07-17 RX ADMIN — DEXAMETHASONE SODIUM PHOSPHATE 10 MG: 10 INJECTION INTRAMUSCULAR; INTRAVENOUS at 00:50

## 2022-07-17 RX ADMIN — MONTELUKAST 10 MG: 10 TABLET, FILM COATED ORAL at 20:39

## 2022-07-17 RX ADMIN — CETIRIZINE HYDROCHLORIDE 10 MG: 10 TABLET, FILM COATED ORAL at 08:46

## 2022-07-17 RX ADMIN — Medication 1 PUFF: at 10:20

## 2022-07-17 ASSESSMENT — PAIN SCALES - GENERAL
PAINLEVEL_OUTOF10: 0
PAINLEVEL_OUTOF10: 2
PAINLEVEL_OUTOF10: 4
PAINLEVEL_OUTOF10: 4
PAINLEVEL_OUTOF10: 3
PAINLEVEL_OUTOF10: 3

## 2022-07-17 ASSESSMENT — PAIN DESCRIPTION - LOCATION
LOCATION: HEAD
LOCATION: HEAD;NECK
LOCATION: HEAD
LOCATION: HEAD
LOCATION: HEAD;NECK

## 2022-07-17 ASSESSMENT — PAIN DESCRIPTION - DESCRIPTORS
DESCRIPTORS: ACHING

## 2022-07-17 ASSESSMENT — PAIN DESCRIPTION - PAIN TYPE
TYPE: CHRONIC PAIN
TYPE: ACUTE PAIN

## 2022-07-17 ASSESSMENT — PAIN DESCRIPTION - FREQUENCY
FREQUENCY: INTERMITTENT
FREQUENCY: INTERMITTENT

## 2022-07-17 ASSESSMENT — PAIN - FUNCTIONAL ASSESSMENT
PAIN_FUNCTIONAL_ASSESSMENT: ACTIVITIES ARE NOT PREVENTED
PAIN_FUNCTIONAL_ASSESSMENT: ACTIVITIES ARE NOT PREVENTED

## 2022-07-17 NOTE — CONSULTS
herniation    BLADDER SURGERY      dilation     SECTION, LOW TRANSVERSE  1973    CHOLECYSTECTOMY  2012    lap    COLONOSCOPY      x2    ECTOPIC PREGNANCY SURGERY  1988    right side    ENDOSCOPY, COLON, DIAGNOSTIC      egd x2. JOINT REPLACEMENT  2019    KNEE ARTHROSCOPY  10/10    left knee    KNEE ARTHROSCOPY      right knee    TUBAL LIGATION  1986    bilateral         Home Medications:    No outpatient medications have been marked as taking for the 22 encounter [facility] Encounter). Allergies:  Nitroglycerin, Oxycodone, Oxycodone-acetaminophen, Penicillin g, Sulfamethizole, Macrodantin [nitrofurantoin], Pcn [penicillins], and Sulfa antibiotics    Social History:    Social History     Socioeconomic History    Marital status:      Spouse name: None    Number of children: None    Years of education: None    Highest education level: None   Tobacco Use    Smoking status: Never    Smokeless tobacco: Never   Vaping Use    Vaping Use: Never used   Substance and Sexual Activity    Alcohol use: No     Comment: occasional wine    Drug use: No    Sexual activity: Not Currently     Partners: Male        Family History:   Family History   Problem Relation Age of Onset    Heart Disease Father         MI    Osteoporosis Mother     Cancer Mother 80        breast    Breast Cancer Mother [de-identified]    Diabetes Sister     Hypertension Sister     COPD Sister     Hypertension Sister     Diabetes Sister     Ovarian Cancer Sister 68    Breast Cancer Sister     Cancer Brother         skin    Heart Attack Brother     Other Brother         hiatal hernia    Other Son         hiatal hernia    Other Grandchild         hiatal hernia       REVIEW OF SYSTEMS:    Constitutional: there has been no unanticipated weight loss. There's been No change in energy level, No change in activity level. Eyes: No visual changes or diplopia. No scleral icterus. ENT: No Headaches, hearing loss or vertigo.  No mouth sores or sore throat. Cardiovascular: As HPI  Respiratory: As HPI  Gastrointestinal: No abdominal pain, appetite loss, blood in stools. No change in bowel or bladder habits. Genitourinary: No dysuria, trouble voiding, or hematuria. Musculoskeletal:  No gait disturbance, No weakness or joint complaints. Integumentary: No rash or pruritis. Neurological: No headache, diplopia, change in muscle strength, numbness or tingling. No change in gait, balance, coordination, mood, affect, memory, mentation, behavior. Psychiatric: No anxiety, or depression. Endocrine: No temperature intolerance. No excessive thirst, fluid intake, or urination. No tremor. Hematologic/Lymphatic: No abnormal bruising or bleeding, blood clots or swollen lymph nodes. Allergic/Immunologic: No nasal congestion or hives. PHYSICAL EXAM:    Physical Examination:    /74   Pulse 76   Temp 98.2 °F (36.8 °C) (Oral)   Resp 18   Ht 5' 5\" (1.651 m)   Wt 200 lb 6.4 oz (90.9 kg)   SpO2 91%   BMI 33.35 kg/m²    Constitutional and General Appearance: alert, cooperative, no distress and appears stated age  HEENT: PERRL, no cervical lymphadenopathy. No masses palpable. Normal oral mucosa  Respiratory:  Normal excursion and expansion without use of accessory muscles  Resp Auscultation: Good respiratory effort. No for increased work of breathing. On auscultation: clear  Cardiovascular:  Heart tones are crisp and normal. regular S1 and S2. Murmurs: 2/6 sys murmur  Jugular venous pulsation Normal  Abdomen:  No masses or tenderness  Bowel sounds present  Extremities:   No Cyanosis or Clubbing   Lower extremity edema: none   Skin: Warm and dry  Neurological:  Alert and oriented.   Moves all extremities well  No abnormalities of mood, affect, memory, mentation, or behavior are noted    DATA:    Diagnostics:      EKG:   AF , non specific TW- ECG not in chart  Will repeat    Nuclear Stress 2016:  Impression: Probably normal.   1. Fixed perfusion defect in the distal anteroseptal wall favoring breast attenuation artifact rather than an area of LAD infarct. No definite stress-induced ischemia. 2. LVEF 70%. Labs:     CBC:   Recent Labs     07/16/22 2239   WBC 12.3*   HGB 15.3   HCT 45.1        BMP:   Recent Labs     07/16/22 2239 07/17/22  0441    141   K 4.0 4.3   CO2 26 23   BUN 17 16   CREATININE 0.56 0.46*   LABGLOM >60 >60   GLUCOSE 106* 122*     BNP: No results for input(s): BNP in the last 72 hours. PT/INR:   Recent Labs     07/16/22 2239   PROTIME 9.5   INR 0.9     APTT:No results for input(s): APTT in the last 72 hours. CARDIAC ENZYMES:  Recent Labs     07/16/22 2239 07/17/22  0003 07/17/22 0441   TROPHS 10 12 14     FASTING LIPID PANEL:  Lab Results   Component Value Date/Time    HDL 53 04/29/2021 08:40 AM    TRIG 192 06/18/2020 07:22 AM     LIVER PROFILE:  Recent Labs     07/17/22  0441   AST 22   ALT 21   LABALBU 4.3         IMPRESSION:    Patient Active Problem List   Diagnosis    Asthma    Frequent UTI    Hypertension    Hiatal hernia    Seizure (HCC)    Precordial pain    Chest pain    Fibromyalgia    Atrial fibrillation with RVR (HCC)     - New AF with RVR- back in NSR  - Reaction to cardizem  - Tachycardia due to above- resolved  - Chest pain due to above- resolved  - 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   - check 2d echo   - acute MI ruled out with troponins  - if echo low risk, okay for d/c, consider stress test as outpatient. Discussed with patient and nursing. Thank you for allowing me to participate in the care of this patient, please do not hesitate to call if you have any questions. Alberto Burgos DO, [facility], [address], [address], [place] [address] [facility]  [facility]. [employer]  [phone]

## 2022-07-17 NOTE — H&P
[facility]  Office: [address], DO, Madelyn Ortiz, DO, June Peraza, DO, Yohana Tai, DO, Karin Lundy MD, Nils Terry MD, Bridgette Waldron MD, Liliam Painter MD, Jamaal Barahona MD, Feli Jurado MD, Gabriel Galarza MD, Yessenia Pulliam, DO, Aman Hillman MD,  Lupe Cleary, DO, Yaima Singh MD, Skye Kaur MD, Loren Suggs DO, Kaylin Lang MD, Nima Layne MD, Malissa Cordon DO, Ayde Rodriguez MD, Alan Stone MD, Baltazar Valdes, [facility], [facility], CNP, Sheri Portre, CNP, Sharona Acosta, CNP, Rebecca Bonds, CNP, Charisma Sorto, CNP, Yohan Mann PA-C, Kurt Hernandez, DNP, Diana Gooden, CNP, Mahamed Cody, CNP, Greta Mason, CNP, Alexus Weems, CNS, Diana Chin, [facility], Smitha Solano, CNP, Yunior Jamison, CNP, Tia Stoddard, [facility]   [address] [facility]    HISTORY AND PHYSICAL EXAMINATION            Date:   7/17/2022  Patient name:  Luis Tinajero  Date of admission:  7/16/2022 10:41 PM  MRN:   [id number]  Account:  [de-identified]  YOB: 1950  PCP:    Bryan Hughes MD  Room:   329/329-  Code Status:    Full Code    Chief Complaint:     Chief Complaint   Patient presents with    Chest Pain       History Obtained From:     patient    History of Present Illness:     Luis Tinajero is a 73 y.o. female with a past medical history of asthma and hypertension who presented to the emergency department on 7/17/2022 complaining of chest pain and heart palpitations. The patient states that her symptoms began suddenly at home and progressively worsened. In the ED, the patient was tachycardic and ill-appearing. She was found to be in atrial fibrillation with rapid ventricular response (new-onset). Troponin was within normal limits. The patient was placed on a diltiazem drip and admitted to internal medicine for further management. The patient converted to sinus rhythm on the diltiazem infusion. Cardiology has been consulted and has recommended echocardiogram prior to discharge. Past Medical History:     Past Medical History:   Diagnosis Date    Arthritis     knees and spine    Asthma     Chest pain     hx. of , due to stress , acid reflux acting up, esophogeal spasm,    Esophageal spasm     hx. 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 Left side of face droops. Osteopenia 2017    dexa scan    PONV (postoperative nausea and vomiting)     Seizure (HCC)     left side brain, stopped with topamax, see's neurologis Dr. Sebastian Chen every 6 months. Wears glasses         Past Surgical History:     Past Surgical History:   Procedure Laterality Date    ACROMIOPLASTY Left 2/3/16    ARTHROSCOPY SHOULDER WITH SAD EXCISION LOOSE BODIES AND DEBRIDEMENT LABRAL TEAR    APPENDECTOMY      BACK SURGERY      for herniation    BLADDER SURGERY      dilation     SECTION, LOW TRANSVERSE  1973    CHOLECYSTECTOMY  2012    lap    COLONOSCOPY      x2    ECTOPIC PREGNANCY SURGERY  1988    right side    ENDOSCOPY, COLON, DIAGNOSTIC      egd x2. JOINT REPLACEMENT  2019    KNEE ARTHROSCOPY  10/10    left knee    KNEE ARTHROSCOPY      right knee    TUBAL LIGATION      bilateral        Medications Prior to Admission:     Prior to Admission medications    Medication Sig Start Date End Date Taking? Authorizing Provider   vitamin D (CHOLECALCIFEROL) 1000 UNIT TABS tablet Take 1,000 Units by mouth daily    Historical Provider, MD   cetirizine (ZYRTEC) 10 MG tablet Take 10 mg by mouth daily    Historical Provider, MD   losartan (COZAAR) 50 MG tablet Take 50 mg by mouth daily. Historical Provider, MD   omeprazole (PRILOSEC) 40 MG capsule Take 40 mg by mouth daily. Historical Provider, MD   montelukast (SINGULAIR) 10 MG tablet Take 10 mg by mouth nightly. Historical Provider, MD   albuterol (PROVENTIL HFA;VENTOLIN HFA) 108 (90 BASE) MCG/ACT inhaler Inhale 2 puffs into the lungs every 6 hours as needed. Historical Provider, MD   calcium carbonate-vitamin D 600-200 MG-UNIT TABS Take  by mouth. Historical Provider, MD   therapeutic multivitamin-minerals (THERAGRAN-M) tablet Take 1 tablet by mouth daily. Historical Provider, MD   frovatriptan succinate (FROVA) 2.5 MG TABS Take 2.5 mg by mouth as needed. Historical Provider, MD        Allergies:     Nitroglycerin, Oxycodone, Oxycodone-acetaminophen, Penicillin g, Sulfamethizole, Macrodantin [nitrofurantoin], Pcn [penicillins], and Sulfa antibiotics    Social History:     Tobacco:    reports that she has never smoked. She has never used smokeless tobacco.  Alcohol:      reports no history of alcohol use. Drug Use:  reports no history of drug use. Family History:     Family History   Problem Relation Age of Onset    Heart Disease Father         MI    Osteoporosis Mother     Cancer Mother 80        breast    Breast Cancer Mother [de-identified]    Diabetes Sister     Hypertension Sister     COPD Sister     Hypertension Sister     Diabetes Sister     Ovarian Cancer Sister 68    Breast Cancer Sister     Cancer Brother         skin    Heart Attack Brother     Other Brother         hiatal hernia    Other Son         hiatal hernia    Other Grandchild         hiatal hernia       Review of Systems:     Positive and Negative as described in HPI. CONSTITUTIONAL:  negative for fevers, chills, sweats, fatigue, weight loss  HEENT:  negative for vision, hearing changes, runny nose, throat pain  RESPIRATORY:  negative for shortness of breath, cough, congestion, wheezing  CARDIOVASCULAR:  Positive for palpitations.    GASTROINTESTINAL:  negative for nausea, vomiting, diarrhea, constipation, change in bowel habits, abdominal pain   GENITOURINARY:  negative for difficulty of urination, burning with urination, frequency exposed skin area  Extremities:  peripheral pulses palpable, no pedal edema or calf pain with palpation  Psych: normal affect     Investigations:      Laboratory Testing:  Recent Results (from the past 24 hour(s))   Troponin    Collection Time: 07/16/22 10:39 PM   Result Value Ref Range    Troponin, High Sensitivity 10 0 - 14 ng/L   CBC with Auto Differential    Collection Time: 07/16/22 10:39 PM   Result Value Ref Range    WBC 12.3 (H) 3.5 - 11.0 k/uL    RBC 4.92 4.0 - 5.2 m/uL    Hemoglobin 15.3 12.0 - 16.0 g/dL    Hematocrit 45.1 36 - 46 %    MCV 91.7 80 - 100 fL    MCH 31.2 26 - 34 pg    MCHC 34.0 31 - 37 g/dL    RDW 13.2 12.5 - 15.4 %    Platelets 565 244 - 082 k/uL    MPV 7.3 6.0 - 12.0 fL    Seg Neutrophils 50 36 - 66 %    Lymphocytes 37 24 - 44 %    Monocytes 9 2 - 11 %    Eosinophils % 3 1 - 4 %    Basophils 1 0 - 2 %    Segs Absolute 6.30 1.8 - 7.7 k/uL    Absolute Lymph # 4.50 1.0 - 4.8 k/uL    Absolute Mono # 1.10 0.1 - 1.2 k/uL    Absolute Eos # 0.40 0.0 - 0.4 k/uL    Basophils Absolute 0.10 0.0 - 0.2 k/uL   Basic Metabolic Panel    Collection Time: 07/16/22 10:39 PM   Result Value Ref Range    Glucose 106 (H) 70 - 99 mg/dL    BUN 17 8 - 23 mg/dL    CREATININE 0.56 0.50 - 0.90 mg/dL    Calcium 9.8 8.6 - 10.4 mg/dL    Sodium 140 135 - 144 mmol/L    Potassium 4.0 3.7 - 5.3 mmol/L    Chloride 103 98 - 107 mmol/L    CO2 26 20 - 31 mmol/L    Anion Gap 11 9 - 17 mmol/L    GFR Non-African American >60 >60 mL/min    GFR African American >60 >60 mL/min    GFR Comment         Protime-INR    Collection Time: 07/16/22 10:39 PM   Result Value Ref Range    Protime 9.5 9.4 - 12.6 sec    INR 0.9    Troponin    Collection Time: 07/17/22 12:03 AM   Result Value Ref Range    Troponin, High Sensitivity 12 0 - 14 ng/L   Comprehensive Metabolic Panel w/ Reflex to MG    Collection Time: 07/17/22  4:41 AM   Result Value Ref Range    Glucose 122 (H) 70 - 99 mg/dL    BUN 16 8 - 23 mg/dL    CREATININE 0.46 (L) 0.50 - 0.90 mg/dL Calcium 9.5 8.6 - 10.4 mg/dL    Sodium 141 135 - 144 mmol/L    Potassium 4.3 3.7 - 5.3 mmol/L    Chloride 107 98 - 107 mmol/L    CO2 23 20 - 31 mmol/L    Anion Gap 11 9 - 17 mmol/L    Alkaline Phosphatase 113 (H) 35 - 104 U/L    ALT 21 5 - 33 U/L    AST 22 <32 U/L    Total Bilirubin 0.11 (L) 0.3 - 1.2 mg/dL    Total Protein 6.7 6.4 - 8.3 g/dL    Albumin 4.3 3.5 - 5.2 g/dL    Albumin/Globulin Ratio 1.8 1.0 - 2.5    GFR Non-African American >60 >60 mL/min    GFR African American >60 >60 mL/min    GFR Comment         Magnesium    Collection Time: 07/17/22  4:41 AM   Result Value Ref Range    Magnesium 2.2 1.6 - 2.6 mg/dL   TSH with Reflex    Collection Time: 07/17/22  4:41 AM   Result Value Ref Range    TSH 0.55 0.30 - 5.00 uIU/mL   Troponin    Collection Time: 07/17/22  4:41 AM   Result Value Ref Range    Troponin, High Sensitivity 14 0 - 14 ng/L   EKG 12 Lead    Collection Time: 07/17/22 10:21 AM   Result Value Ref Range    Ventricular Rate 87 BPM    Atrial Rate 87 BPM    P-R Interval 174 ms    QRS Duration 78 ms    Q-T Interval 380 ms    QTc Calculation (Bazett) 457 ms    P Axis 8 degrees    R Axis -35 degrees    T Axis 38 degrees       Imaging/Diagnostics:  XR CHEST PORTABLE    Result Date: 7/16/2022  No acute abnormality.        Assessment :      Hospital Problems             Last Modified POA    * (Principal) Atrial fibrillation with RVR ([address] 7/17/2022 Yes    Asthma 7/17/2022 Yes    Hypertension 7/17/2022 Yes       Plan:     Patient status inpatient in the Med/Surge    Atrial fibrillation   -New-onset   -The patient converted to sinus rhythm on diltiazem drip   -CHADsVASC 2   -Continue therapeutic Lovenox (will transition to Eliquis on discharge)   -Echocardiogram pending   -Stop diltiazem infusion and start PO diltiazem 30 mg q12h   -Cardiology following; appreciate recommendations     Hypertension   -Continue home losartan 50 mg daily     Asthma   -Continue home Symbicort inhaler     Consultations:   IP Osmani Hurtado MD  7/17/2022  11:29 AM    Copy sent to Dr. Veronica Prado MD

## 2022-07-17 NOTE — PLAN OF CARE
Problem: Discharge Planning  Goal: Discharge to home or other facility with appropriate resources  Outcome: Progressing  Flowsheets  Taken 7/17/2022 0401  Discharge to home or other facility with appropriate resources: Identify barriers to discharge with patient and caregiver  Taken 7/17/2022 0129  Discharge to home or other facility with appropriate resources: Identify barriers to discharge with patient and caregiver     Problem: Pain  Goal: Verbalizes/displays adequate comfort level or baseline comfort level  Outcome: Progressing  Flowsheets (Taken 7/17/2022 0129)  Verbalizes/displays adequate comfort level or baseline comfort level: Encourage patient to monitor pain and request assistance

## 2022-07-17 NOTE — ED NOTES
Pt to ER with , to room per wheelchair, transferred self to bed. Pt reports at 2200 she felt left side chest pain radiating to shoulder and jaw, pt reports home ekg monitor states heart rate in the 160's. Pt denies h/o arrhythmias but does report known aneurysm, reports workup to be done this week with cardiology. Pt rates pain 4/10, does report taking excedrin migraine before bed.  Pt calm, cooperative, respers even non labored, skin warm pink, pt denies dizziness or SOB, no immediate distress, here for eval.  2 IV established, EKG done, cardiac monitor placed, MD  To bedside      Briana Roca, RN  07/16/22 6677

## 2022-07-17 NOTE — ED NOTES
Pt reports feeling better, out of trendelenburg position, remains flat in bed. Wet wash cloth placed to head, warm blanket passed.   remains at [address] [place] [address]  07/17/22 3503

## 2022-07-17 NOTE — ED NOTES
Pt calls out, reports feeling dizzy and diaphoretic, and not feeling well, SBP low, pt placed in trendelenburg, MD to bedside.       Williams Shelton RN  07/17/22 1924

## 2022-07-17 NOTE — ED PROVIDER NOTES
eMERGENCY dEPARTMENT eNCOUnter      Pt Name: Philippe Childs  MRN: [id number]  [place] 1950  Date of evaluation: 2022      CHIEF COMPLAINT       Chief Complaint   Patient presents with    Chest Pain          HISTORY OF PRESENT ILLNESS    Philippe Childs is a 73 y.o. female who presents to department for evaluation of heart racing for the last 40 minutes and also chest pain with some radiation into her left arm and into her left jaw. Patient relates no nausea, vomiting, diaphoresis or shortness of breath. Patient states that she does have a 4 cm or so aortic aneurysm that was just discovered on a routine chest x-ray recently. REVIEW OF SYSTEMS       Review of Systems   Constitutional: Negative. HENT: Negative. Eyes: Negative. Respiratory: Negative. Cardiovascular:  Positive for chest pain and palpitations. Gastrointestinal: Negative. Endocrine: Negative. Genitourinary: Negative. Musculoskeletal: Negative. Skin: Negative. Allergic/Immunologic: Negative. Neurological: Negative. Hematological: Negative. Psychiatric/Behavioral: Negative. PAST MEDICAL HISTORY    has a past medical history of Arthritis, Asthma, Chest pain, Esophageal spasm, Fibromyalgia, Frequent UTI, Headache, Hiatal hernia, HTN (hypertension), Nerve damage, Osteopenia, PONV (postoperative nausea and vomiting), Seizure ([address] and Wears glasses. SURGICAL HISTORY      has a past surgical history that includes Tubal ligation (); Ectopic pregnancy surgery ();  section, low transverse (); Appendectomy; Bladder surgery; back surgery; Knee arthroscopy (10/10); Knee arthroscopy (); Colonoscopy; Endoscopy, colon, diagnostic; Acromioplasty (Left, 2/3/16); Cholecystectomy (); and joint replacement (2019).     CURRENT MEDICATIONS       Previous Medications    ALBUTEROL (PROVENTIL HFA;VENTOLIN HFA) 108 (90 BASE) MCG/ACT INHALER    Inhale 2 puffs into the lungs every 6 hours as needed. CALCIUM CARBONATE-VITAMIN D (CALCIUM + D) 600-200 MG-UNIT TABS    Take  by mouth. CETIRIZINE (ZYRTEC) 10 MG TABLET    Take 10 mg by mouth daily    FROVATRIPTAN SUCCINATE (FROVA) 2.5 MG TABS    Take 2.5 mg by mouth as needed. LOSARTAN (COZAAR) 50 MG TABLET    Take 50 mg by mouth daily. MONTELUKAST (SINGULAIR) 10 MG TABLET    Take 10 mg by mouth nightly. OMEPRAZOLE (PRILOSEC) 40 MG CAPSULE    Take 40 mg by mouth daily. THERAPEUTIC MULTIVITAMIN-MINERALS (THERAGRAN-M) TABLET    Take 1 tablet by mouth daily. VITAMIN D (CHOLECALCIFEROL) 1000 UNIT TABS TABLET    Take 1,000 Units by mouth daily       ALLERGIES     is allergic to nitroglycerin, oxycodone, oxycodone-acetaminophen, penicillin g, sulfamethizole, macrodantin [nitrofurantoin], pcn [penicillins], and sulfa antibiotics. FAMILY HISTORY     She indicated that her mother is . She indicated that her father is . She indicated that only one of her two sisters is alive. She indicated that both of her brothers are alive. She indicated that the status of her son is unknown. She indicated that the status of her grandchild is unknown.     family history includes Breast Cancer in her sister; Breast Cancer (age of onset: [de-identified]) in her mother; COPD in her sister; Cancer in her brother; Cancer (age of onset: 80) in her mother; Diabetes in her sister and sister; Heart Attack in her brother; Heart Disease in her father; Hypertension in her sister and sister; Osteoporosis in her mother; Other in her brother, grandchild, and son; Ovarian Cancer (age of onset: 68) in her sister. SOCIAL HISTORY      reports that she has never smoked. She has never used smokeless tobacco. She reports that she does not drink alcohol and does not use drugs. PHYSICAL EXAM     INITIAL VITALS:  height is 5' 5\" (1.651 m) and weight is 85.7 kg (189 lb). Her oral temperature is 98.7 °F (37.1 °C).  Her blood pressure is 132/75 and her pulse is 73. Her respiration is 12 and oxygen saturation is 95%. Constitutional: Alert, oriented x3, nontoxic, afebrile, answering questions appropriately, acting properly for age, in no acute distress  HEENT: Extraocular muscles intact, mucus membranes moist, TMs clear bilaterally, no posterior pharyngeal erythema or exudates, Pupils equal, round, reactive to light,   Neck: Trachea midline, Supple without lymphadenopathy, no posterior midline neck tenderness to palpation  Cardiovascular: Regular rhythm and rate no S3, S4, or murmurs  Respiratory: Clear to auscultation bilaterally no wheezes, rhonchi, rales, no respiratory distress  Gastrointestinal: Soft, nontender, nondistended, positive bowel sounds. No rebound, rigidity, or guarding. Musculoskeletal: No extremity pain or swelling  Neurologic: Moving all 4 extremities without difficulty there are no gross focal neurologic deficits  Skin: Warm and dry    DIFFERENTIAL DIAGNOSIS/ MDM:     Chest pain possibly cardiac in nature, patient was going to get some aspirin some morphine and can slow her heart rate down with some Lopressor. Laboratories will be sent. EKG shows atrial fibrillation. DIAGNOSTIC RESULTS     EKG: All EKG's are interpreted by the Emergency Department Physician who either signs or Co-signs this chart in the absence of a cardiologist.    EKG is in atrial fibrillation around 166 bpm, no CA interval QRS duration 0.084, QTc 471 ms. PVCs are noted some biphasic T waves noted.     Not indicated unless otherwise documented above    LABS:  Results for orders placed or performed during the hospital encounter of 07/16/22   Troponin   Result Value Ref Range    Troponin, High Sensitivity 10 0 - 14 ng/L   CBC with Auto Differential   Result Value Ref Range    WBC 12.3 (H) 3.5 - 11.0 k/uL    RBC 4.92 4.0 - 5.2 m/uL    Hemoglobin 15.3 12.0 - 16.0 g/dL    Hematocrit 45.1 36 - 46 %    MCV 91.7 80 - 100 fL    MCH 31.2 26 - 34 pg    MCHC 34.0 31 - 37 g/dL    RDW 13.2 12.5 - 15.4 %    Platelets 666 317 - 002 k/uL    MPV 7.3 6.0 - 12.0 fL    Seg Neutrophils 50 36 - 66 %    Lymphocytes 37 24 - 44 %    Monocytes 9 2 - 11 %    Eosinophils % 3 1 - 4 %    Basophils 1 0 - 2 %    Segs Absolute 6.30 1.8 - 7.7 k/uL    Absolute Lymph # 4.50 1.0 - 4.8 k/uL    Absolute Mono # 1.10 0.1 - 1.2 k/uL    Absolute Eos # 0.40 0.0 - 0.4 k/uL    Basophils Absolute 0.10 0.0 - 0.2 k/uL   Basic Metabolic Panel   Result Value Ref Range    Glucose 106 (H) 70 - 99 mg/dL    BUN 17 8 - 23 mg/dL    CREATININE 0.56 0.50 - 0.90 mg/dL    Calcium 9.8 8.6 - 10.4 mg/dL    Sodium 140 135 - 144 mmol/L    Potassium 4.0 3.7 - 5.3 mmol/L    Chloride 103 98 - 107 mmol/L    CO2 26 20 - 31 mmol/L    Anion Gap 11 9 - 17 mmol/L    GFR Non-African American >60 >60 mL/min    GFR African American >60 >60 mL/min    GFR Comment         Protime-INR   Result Value Ref Range    Protime 9.5 9.4 - 12.6 sec    INR 0.9    Troponin   Result Value Ref Range    Troponin, High Sensitivity 12 0 - 14 ng/L       Not indicated unless otherwise documented above    RADIOLOGY:   I reviewed the radiologist interpretations:  XR CHEST PORTABLE   Final Result   No acute abnormality.              Not indicated unless otherwise documented above    EMERGENCY DEPARTMENT COURSE:     The patient was given the following medications:  Orders Placed This Encounter   Medications    aspirin chewable tablet 81 mg    metoprolol (LOPRESSOR) injection 5 mg    morphine (PF) injection 1 mg    ondansetron (ZOFRAN) injection 4 mg    0.9 % sodium chloride bolus        Vitals:    Vitals:    07/16/22 2330 07/16/22 2356 07/17/22 0000 07/17/22 0006   BP: (!) 131/92 87/74 92/62 132/75   Pulse: 81 67 70 73   Resp: 12 15 17 12   Temp:       TempSrc:       SpO2: 95% 95% 95% 95%   Weight:       Height:         -------------------------  /75   Pulse 73   Temp 98.7 °F (37.1 °C) (Oral)   Resp 12   Ht 5' 5\" (1.651 m)   Wt 85.7 kg (189 lb)   SpO2 95%   BMI 31.45 kg/m² I have reviewed the disposition diagnosis with the patient and or their family/guardian. I have answered their questions and given discharge instructions. They voiced understanding of these instructions and did not have any further questions or complaints. CRITICAL CARE:    None    CONSULTS:    None    PROCEDURES:    None      OARRS Report if indicated             FINAL IMPRESSION      1. Palpitations    2. Paroxysmal atrial fibrillation (HCC)    3. Chest pain, unspecified type          DISPOSITION/PLAN   DISPOSITION Decision To Admit    I have reviewed the disposition diagnosis with the patient and or their family/guardian. I have answered their questions and given discharge instructions. They voiced understanding of these instructions and did not have any further questions or complaints. Reevaluation: Patient has had an episode of bradycardia she had hypotension and secondary to vagal laying down and we treated her with fluids Trendelenburg and some Zofran for nausea and her pressures came back up to normal levels and heart rate settled in the right about 70 bpm.  First troponin was 17 were waiting for the second 1 that we just to I did contact Gregoria Woody for the admission here in the hospital.  PATIENT REFERRED TO:  No follow-up provider specified.     DISCHARGE MEDICATIONS:  New Prescriptions    No medications on file       (Please note that portions of this note were completed with a voice recognition program.  Efforts were made to edit the dictations but occasionally words are mis-transcribed.)    Jimbo Harry MD  Attending Emergency Physician           Jimbo Harry MD  07/17/22 5057

## 2022-07-18 VITALS
HEART RATE: 68 BPM | DIASTOLIC BLOOD PRESSURE: 82 MMHG | HEIGHT: 65 IN | RESPIRATION RATE: 16 BRPM | BODY MASS INDEX: 33.46 KG/M2 | SYSTOLIC BLOOD PRESSURE: 150 MMHG | WEIGHT: 200.84 LBS | TEMPERATURE: 98.2 F | OXYGEN SATURATION: 95 %

## 2022-07-18 LAB
EKG ATRIAL RATE: 340 BPM
EKG ATRIAL RATE: 74 BPM
EKG ATRIAL RATE: 87 BPM
EKG P AXIS: 25 DEGREES
EKG P AXIS: 8 DEGREES
EKG P-R INTERVAL: 174 MS
EKG P-R INTERVAL: 176 MS
EKG Q-T INTERVAL: 284 MS
EKG Q-T INTERVAL: 380 MS
EKG Q-T INTERVAL: 410 MS
EKG QRS DURATION: 78 MS
EKG QRS DURATION: 78 MS
EKG QRS DURATION: 84 MS
EKG QTC CALCULATION (BAZETT): 455 MS
EKG QTC CALCULATION (BAZETT): 457 MS
EKG QTC CALCULATION (BAZETT): 471 MS
EKG R AXIS: -27 DEGREES
EKG R AXIS: -34 DEGREES
EKG R AXIS: -35 DEGREES
EKG T AXIS: 115 DEGREES
EKG T AXIS: 38 DEGREES
EKG T AXIS: 46 DEGREES
EKG VENTRICULAR RATE: 166 BPM
EKG VENTRICULAR RATE: 74 BPM
EKG VENTRICULAR RATE: 87 BPM
INR BLD: 1
LV EF: 55 %
LVEF MODALITY: NORMAL
PRO-BNP: 246 PG/ML
PROTHROMBIN TIME: 10 SEC (ref 9.4–12.6)

## 2022-07-18 PROCEDURE — 2580000003 HC RX 258: Performed by: NURSE PRACTITIONER

## 2022-07-18 PROCEDURE — G0378 HOSPITAL OBSERVATION PER HR: HCPCS

## 2022-07-18 PROCEDURE — 94640 AIRWAY INHALATION TREATMENT: CPT

## 2022-07-18 PROCEDURE — 6370000000 HC RX 637 (ALT 250 FOR IP): Performed by: STUDENT IN AN ORGANIZED HEALTH CARE EDUCATION/TRAINING PROGRAM

## 2022-07-18 PROCEDURE — 83880 ASSAY OF NATRIURETIC PEPTIDE: CPT

## 2022-07-18 PROCEDURE — 93306 TTE W/DOPPLER COMPLETE: CPT

## 2022-07-18 PROCEDURE — 6360000002 HC RX W HCPCS: Performed by: NURSE PRACTITIONER

## 2022-07-18 PROCEDURE — 96372 THER/PROPH/DIAG INJ SC/IM: CPT

## 2022-07-18 PROCEDURE — 94760 N-INVAS EAR/PLS OXIMETRY 1: CPT

## 2022-07-18 PROCEDURE — 6370000000 HC RX 637 (ALT 250 FOR IP): Performed by: NURSE PRACTITIONER

## 2022-07-18 PROCEDURE — 36415 COLL VENOUS BLD VENIPUNCTURE: CPT

## 2022-07-18 PROCEDURE — 99217 PR OBSERVATION CARE DISCHARGE MANAGEMENT: CPT | Performed by: STUDENT IN AN ORGANIZED HEALTH CARE EDUCATION/TRAINING PROGRAM

## 2022-07-18 PROCEDURE — 85610 PROTHROMBIN TIME: CPT

## 2022-07-18 RX ORDER — METOPROLOL SUCCINATE 25 MG/1
25 TABLET, EXTENDED RELEASE ORAL DAILY
Qty: 30 TABLET | Refills: 5 | Status: CANCELLED | OUTPATIENT
Start: 2022-07-18

## 2022-07-18 RX ADMIN — CETIRIZINE HYDROCHLORIDE 10 MG: 10 TABLET, FILM COATED ORAL at 08:54

## 2022-07-18 RX ADMIN — METOPROLOL TARTRATE 25 MG: 25 TABLET, FILM COATED ORAL at 08:57

## 2022-07-18 RX ADMIN — ACETAMINOPHEN 650 MG: 325 TABLET ORAL at 08:57

## 2022-07-18 RX ADMIN — ENOXAPARIN SODIUM 90 MG: 100 INJECTION SUBCUTANEOUS at 06:08

## 2022-07-18 RX ADMIN — LOSARTAN POTASSIUM 50 MG: 50 TABLET, FILM COATED ORAL at 08:54

## 2022-07-18 RX ADMIN — Medication 1 PUFF: at 08:02

## 2022-07-18 RX ADMIN — Medication 1000 UNITS: at 08:54

## 2022-07-18 RX ADMIN — SODIUM CHLORIDE, PRESERVATIVE FREE 10 ML: 5 INJECTION INTRAVENOUS at 08:59

## 2022-07-18 ASSESSMENT — PAIN - FUNCTIONAL ASSESSMENT: PAIN_FUNCTIONAL_ASSESSMENT: ACTIVITIES ARE NOT PREVENTED

## 2022-07-18 ASSESSMENT — PAIN DESCRIPTION - DESCRIPTORS: DESCRIPTORS: ACHING

## 2022-07-18 ASSESSMENT — PAIN SCALES - GENERAL
PAINLEVEL_OUTOF10: 2
PAINLEVEL_OUTOF10: 2
PAINLEVEL_OUTOF10: 0

## 2022-07-18 ASSESSMENT — PAIN DESCRIPTION - LOCATION
LOCATION: HEAD
LOCATION: HEAD

## 2022-07-18 ASSESSMENT — PAIN DESCRIPTION - FREQUENCY: FREQUENCY: INTERMITTENT

## 2022-07-18 ASSESSMENT — PAIN DESCRIPTION - ORIENTATION: ORIENTATION: LEFT

## 2022-07-18 ASSESSMENT — PAIN DESCRIPTION - PAIN TYPE: TYPE: CHRONIC PAIN

## 2022-07-18 NOTE — PROGRESS NOTES
[facility]   Progress Note                   Date:   7/18/2022  Patient name: Gurjit Story  Date of admission:  7/16/2022 10:41 PM  MRN:   [id number]  YOB: 1950  PCP: Aislinn Velasquez MD    Reason for Admission: Palpitations [R00.2]  Paroxysmal atrial fibrillation ([address] [I48.0]  Atrial fibrillation with RVR ([address] [I48.91]  Chest pain, unspecified type [R07.9]  Atrial fibrillation with rapid ventricular response ([address] [I48.91]    Subjective:       Clinical Changes / Abnormalities:Pt seen and examined in the room, with RN. Pt denies any CP or SOB. Remains NSR. Pt states that she is ready to go home       Medications:   Scheduled Meds:   apixaban  5 mg Oral BID    sodium chloride flush  5-40 mL IntraVENous 2 times per day    losartan  50 mg Oral Daily    montelukast  10 mg Oral Nightly    pantoprazole  40 mg Oral QAM AC    Vitamin D  1,000 Units Oral Daily    cetirizine  10 mg Oral Daily    budesonide-formoterol  1 puff Inhalation BID    dilTIAZem  30 mg Oral 2 times per day    morphine  1 mg IntraVENous Once     Continuous Infusions:   sodium chloride       CBC:   Recent Labs     07/16/22 2239   WBC 12.3*   HGB 15.3        BMP:    Recent Labs     07/16/22 2239 07/17/22  0441    141   K 4.0 4.3    107   CO2 26 23   BUN 17 16   CREATININE 0.56 0.46*   GLUCOSE 106* 122*     Hepatic:   Recent Labs     07/17/22  0441   AST 22   ALT 21   BILITOT 0.11*   ALKPHOS 113*     Troponin:   Recent Labs     07/16/22 2239 07/17/22  0003 07/17/22  0441   TROPHS 10 12 14     BNP: No results for input(s): BNP in the last 72 hours. Lipids: No results for input(s): CHOL, HDL in the last 72 hours.     Invalid input(s): LDLCALCU  INR:   Recent Labs     07/16/22 2239 07/18/22  0527   INR 0.9 1.0       Objective:   Vitals: BP (!) 146/76   Pulse 72   Temp 98.4 °F (36.9 °C) (Oral)   Resp 16   Ht 5' 5\" (1.651 m)   Wt 200 lb 13.4 oz (91.1 kg)   SpO2 95%   BMI 33.42 kg/m²   General appearance: alert and cooperative with exam  HEENT: Head: Normocephalic, no lesions, without obvious abnormality. 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 Will d/c Cardizem due to reaction and starton BB. Continue eliquis 5mg BID.    ECHO today.    If low risk, ok to d/c and follow up as outpt    Electronically signed by MURRAY Jefferson CNP on 7/18/2022 at 8:48 AM  [id number]  [phone]

## 2022-07-18 NOTE — PLAN OF CARE
Problem: Pain  Goal: Verbalizes/displays adequate comfort level or baseline comfort level  Outcome: Progressing  Flowsheets (Taken 7/17/2022 2021)  Verbalizes/displays adequate comfort level or baseline comfort level: Encourage patient to monitor pain and request assistance     Problem: Discharge Planning  Goal: Discharge to home or other facility with appropriate resources  Outcome: Progressing  Flowsheets (Taken 7/17/2022 2021)  Discharge to home or other facility with appropriate resources: Identify barriers to discharge with patient and caregiver

## 2022-07-18 NOTE — DISCHARGE INSTR - COC
ventricular response (HCC) I48.91       Isolation/Infection:   Isolation            No Isolation          Patient Infection Status       Infection Onset Added Last Indicated Last Indicated By Review Planned Expiration Resolved Resolved By    C-diff Rule Out 21 Clostridium Difficile Toxin/Antigen (Ordered)                Nurse Assessment:  Last Vital Signs: BP (!) 150/79   Pulse 60   Temp 98.6 °F (37 °C) (Oral)   Resp 17   Ht 5' 5\" (1.651 m)   Wt 200 lb 13.4 oz (91.1 kg)   SpO2 95%   BMI 33.42 kg/m²     Last documented pain score (0-10 scale): Pain Level: 2  Last Weight:   Wt Readings from Last 1 Encounters:   22 200 lb 13.4 oz (91.1 kg)     Mental Status:  {IP PT MENTAL STATUS:}    IV Access:  { JOSE IV ACCESS:297700092}    Nursing Mobility/ADLs:  Walking   {[facility] DME MC}  Transfer  {[facility] DME FNFD:877295000}  Bathing  {[facility] DME VSGE:777757081}  Dressing  {[facility] DME QRJW:598270262}  Toileting  {[facility] DME WGVI:654785235}  Feeding  {[facility] DME JDSA:902186869}  Med Admin  {[facility] DME OGHP:311405669}  Med Delivery   {[facility] MED Delivery:833640786}    Wound Care Documentation and Therapy:  Incision 16 Shoulder Left (Active)   Number of days: 9899        Elimination:  Continence: Bowel: {YES / RB:39751}  Bladder: {YES / CARIDAD:05015}  Urinary Catheter: {Urinary Catheter:893183801}   Colostomy/Ileostomy/Ileal Conduit: {YES / PT:98432}       Date of Last BM: ***    Intake/Output Summary (Last 24 hours) at 2022 1447  Last data filed at 2022 1015  Gross per 24 hour   Intake 1020 ml   Output --   Net 1020 ml     I/O last 3 completed shifts:   In: 999 [IV Piggyback:999]  Out: 900 [Urine:900]    Safety Concerns:     [employer] Safety Concerns:239135641}    Impairments/Disabilities:      [employer] Impairments/Disabilities:595564112}    Nutrition Therapy:  Current Nutrition Therapy:   [address] Diet List:937512476}    Routes of Feeding: {[facility] DME Other Feedings:779368788}  Liquids: {Slp

## 2022-07-18 NOTE — DISCHARGE SUMMARY
[facility]  Office: [address], DO, Curtis Garzon, DO, Shivam Caruso, DO, Michelle [facility], DO, Ruth Gallego MD, Luis Enrique Hwang MD, Laila Aaron MD, Derrick Koehler MD, Karen Durant MD, Jero Johnson MD, Chicho White MD, Dasha Cunningham, DO, Alfredo Esquivel MD,  Chrissy Rodriguez, DO, Magdalena Gomez MD, Yury Mariscal MD, Maritza Boudreaux DO, Rema Maharaj MD, Corie Ladd MD, Leann Ace, DO, Ninoska Cooper MD, Jeaneth Landry MD, Sakina Mann, [facility], [facility], CNP, Sabrina Vieira, CNP, Polo Gibson, CNP, Elizabeth Barnes, CNP, Jorge A Jackson, CNP, Juan Carlos Morley PA-C, Brando Khan, [facility], Jalen Lyons, CNP, Alix James, CNP, Denver Millard, CNP, Chanel Murry, CNS, Farhat Gordon, [facility], Richy Talbert, CNP, Joby Jefferson, CNP, Corina [facility], [facility] [address]    Discharge Summary     Patient ID: Bernard Florian  :  1950   MRN: [id number]     ACCOUNT:  [de-identified]   Patient's PCP: Edmond Corona MD  Admit Date: 2022   Discharge Date: 2022     Length of Stay: 1  Code Status:  Full Code  Admitting Physician: Yury Mariscal MD  Discharge Physician: Yury Mariscal MD     Active Discharge Diagnoses:     Hospital Problem Lists:  Principal Problem:    Atrial fibrillation with RVR ([facility]  Active Problems:    Atrial fibrillation with rapid ventricular response ([facility]    Asthma    Hypertension  Resolved Problems:    * No resolved hospital problems. *      Admission Condition:  fair     Discharged Condition: good    Hospital Stay:     Hospital Course:  Bernard Florian is a 73 y.o. female with a past medical history of asthma and hypertension who presented to the emergency department on 2022 complaining of chest pain and heart palpitations. The patient states that her symptoms began suddenly at home and progressively worsened. In the ED, the patient was tachycardic and ill-appearing.  She was found to be in atrial fibrillation with rapid ventricular response (new-onset). Troponin was within normal limits. The patient was placed on a diltiazem drip and admitted to internal medicine for further management. The patient converted to sinus rhythm on the diltiazem infusion. Cardiology has been consulted and has recommended echocardiogram prior to discharge. The patient improved throughout the course of admission. Echocardiogram showed normal EF with no significant valvular abnormalities. The ascending aorta was incidentally noted to be dilated at 4.1 cm (the patient states she was already aware of this and already has an appointment with vascular surgery). She is discharged today (7/18) in stable condition. The patient is instructed to take diltiazem, metoprolol and Eliquis as prescribed. She is further instructed to follow-up with cardiology and her primary care physician as scheduled. Significant therapeutic interventions: Diltiazem infusion; cardiology consultation     Significant Diagnostic Studies:   Labs / Micro:  BMP:    Lab Results   Component Value Date/Time    GLUCOSE 122 07/17/2022 04:41 AM    GLUCOSE 77 03/01/2012 01:50 PM     07/17/2022 04:41 AM    K 4.3 07/17/2022 04:41 AM     07/17/2022 04:41 AM    CO2 23 07/17/2022 04:41 AM    ANIONGAP 11 07/17/2022 04:41 AM    BUN 16 07/17/2022 04:41 AM    CREATININE 0.46 07/17/2022 04:41 AM    BUNCRER NOT REPORTED 09/17/2021 08:01 AM    CALCIUM 9.5 07/17/2022 04:41 AM    LABGLOM >60 07/17/2022 04:41 AM    GFRAA >60 07/17/2022 04:41 AM    GFR      07/17/2022 04:41 AM     Radiology:  XR CHEST PORTABLE    Result Date: 7/16/2022  No acute abnormality. Consultations:    Consults:     Final Specialist Recommendations/Findings:   IP CONSULT TO CARDIOLOGY      The patient was seen and examined on day of discharge and this discharge summary is in conjunction with any daily progress note from day of discharge.     Discharge plan:     Disposition: Home    Physician Follow Up:     Audelia Razo MD  [facility] [address]  [phone]    Follow up in 1 week(s)  MINNIE OCONNELL follow-up    [facility]  [address] [place] [address]  [phone]  Schedule an appointment as soon as possible for a visit  Hospital follow-up     Requiring Further Evaluation/Follow Up POST HOSPITALIZATION/Incidental Findings: Follow-up with vascular surgery for further evaluation of incidentally discovered aortic dilation. Diet: regular diet    Activity: As tolerated    Instructions to Patient: Take diltiazem and Eliquis as prescribed. Follow-up with cardiology and your primary care physician as scheduled. Discharge Medications:      Medication List        START taking these medications      apixaban 5 MG Tabs tablet  Commonly known as: Eliquis  Take 1 tablet by mouth in the morning and 1 tablet before bedtime. dilTIAZem 30 MG tablet  Commonly known as: CARDIZEM  Take 1 tablet by mouth in the morning and 1 tablet before bedtime. metoprolol tartrate 25 MG tablet  Commonly known as: LOPRESSOR  Take 1 tablet by mouth in the morning and 1 tablet before bedtime.             CONTINUE taking these medications      albuterol sulfate  (90 Base) MCG/ACT inhaler  Commonly known as: PROVENTIL;VENTOLIN;PROAIR     calcium carbonate-vitamin D 600-200 MG-UNIT Tabs     cetirizine 10 MG tablet  Commonly known as: ZYRTEC     frovatriptan succinate 2.5 MG Tabs  Commonly known as: FROVA     losartan 50 MG tablet  Commonly known as: COZAAR     montelukast 10 MG tablet  Commonly known as: SINGULAIR     omeprazole 40 MG delayed release capsule  Commonly known as: PRILOSEC     therapeutic multivitamin-minerals tablet     vitamin D 1000 UNIT Tabs tablet  Commonly known as: CHOLECALCIFEROL               Where to Get Your Medications        These medications were sent to [facility] [id number] [place], [place] - [address] - [facility], [address] [place] [address] [address] [address]      Phone: [phone]   apixaban 5 MG Tabs tablet  dilTIAZem 30 MG tablet  metoprolol tartrate 25 MG tablet         No discharge procedures on file. Time Spent on discharge is  20 mins in patient examination, evaluation, counseling as well as medication reconciliation, prescriptions for required medications, discharge plan and follow up. Electronically signed by   Fernandez Hicks MD  7/18/2022  5:58 PM      Thank you Dr. Roxana Barajas MD for the opportunity to be involved in this patient's care.

## 2022-07-18 NOTE — PLAN OF CARE
Problem: Discharge Planning  Goal: Discharge to home or other facility with appropriate resources  Outcome: Progressing Towards Goal  Flowsheets (Taken 7/18/2022 0800)  Discharge to home or other facility with appropriate resources: Identify barriers to discharge with patient and caregiver     Problem: Pain  Goal: Verbalizes/displays adequate comfort level or baseline comfort level  Outcome: Progressing Towards Goal

## 2022-07-18 NOTE — CARE COORDINATION
07/18/22 1444   Service Assessment   Patient Orientation Alert and Oriented   Cognition Alert   History Provided By Patient   Primary Caregiver Self   Support Systems Spouse/Significant Other   Patient's Healthcare Decision Maker is: Legal Next of Kin   PCP Verified by CM Yes   Last Visit to PCP Within last 3 months   Prior Functional Level Independent in ADLs/IADLs   Current Functional Level Independent in ADLs/IADLs   Can patient return to prior living arrangement Yes   Ability to make needs known: Good   Social/Functional History   Lives With Spouse   Type of [address] One level   Home Access Stairs to enter with rails   Entrance Stairs - Number of Steps 2   Entrance Stairs - [address] From Family   ADL Assistance Independent   Homemaking Assistance Independent   Ambulation Assistance Independent   Transfer Assistance Independent   Active  Yes   Mode of Transportation SUV   Occupation Retired   Discharge Planning   Type of [address] Prior To Admission None   Potential Assistance Needed N/A   DME Ordered? No   Potential Assistance Purchasing Medications No   Meds-to-Beds: Does the patient want to have any new prescriptions delivered to bedside prior to discharge?  No   Type of Home Care Services None   Patient expects to be discharged to: House   One/Two Story Residence One story   Services At/After Discharge   Services At/After Discharge None       D/c home independent, has pcp and ride      [phone] letter and Code 44 letter given to patient and explained

## 2022-07-18 NOTE — PLAN OF CARE
Problem: Discharge Planning  Goal: Discharge to home or other facility with appropriate resources  Outcome: Progressing Towards Goal  Flowsheets (Taken 7/18/2022 0800)  Discharge to home or other facility with appropriate resources: Identify barriers to discharge with patient and caregiver   Patients questions and concerns addressed and answered. Problem: Pain  Goal: Verbalizes/displays adequate comfort level or baseline comfort level  Outcome: Progressing Towards Goal   Pain scale preformed per protocol and pt treated for pain as documented. Education given.

## 2022-11-30 ENCOUNTER — HOSPITAL ENCOUNTER (OUTPATIENT)
Age: 72
Discharge: HOME | End: 2022-11-30
Payer: MEDICARE

## 2022-11-30 LAB
ABSOLUTE EOS #: 0.2 K/UL (ref 0–0.44)
ABSOLUTE IMMATURE GRANULOCYTE: 0.03 K/UL (ref 0–0.3)
ABSOLUTE LYMPH #: 2.58 K/UL (ref 1.1–3.7)
ABSOLUTE MONO #: 0.79 K/UL (ref 0.1–1.2)
ALBUMIN SERPL-MCNC: 4.3 G/DL (ref 3.5–5.2)
ALBUMIN/GLOBULIN RATIO: 1.3 (ref 1–2.5)
ALP BLD-CCNC: 116 U/L (ref 35–104)
ALT SERPL-CCNC: 18 U/L (ref 5–33)
ANION GAP SERPL CALCULATED.3IONS-SCNC: 14 MMOL/L (ref 9–17)
AST SERPL-CCNC: 23 U/L
BASOPHILS # BLD: 1 % (ref 0–2)
BASOPHILS ABSOLUTE: 0.09 K/UL (ref 0–0.2)
BILIRUB SERPL-MCNC: 0.3 MG/DL (ref 0.3–1.2)
BUN BLDV-MCNC: 17 MG/DL (ref 8–23)
CALCIUM SERPL-MCNC: 9.2 MG/DL (ref 8.6–10.4)
CHLORIDE BLD-SCNC: 101 MMOL/L (ref 98–107)
CHOLESTEROL/HDL RATIO: 4.1
CHOLESTEROL: 196 MG/DL
CO2: 25 MMOL/L (ref 20–31)
CREAT SERPL-MCNC: 0.65 MG/DL (ref 0.5–0.9)
EOSINOPHILS RELATIVE PERCENT: 2 % (ref 1–4)
GFR SERPL CREATININE-BSD FRML MDRD: >60 ML/MIN/1.73M2
GLUCOSE BLD-MCNC: 98 MG/DL (ref 70–99)
HCT VFR BLD CALC: 46 % (ref 36.3–47.1)
HDLC SERPL-MCNC: 48 MG/DL
HEMOGLOBIN: 15.5 G/DL (ref 11.9–15.1)
IMMATURE GRANULOCYTES: 0 %
LDL CHOLESTEROL: 113 MG/DL (ref 0–130)
LYMPHOCYTES # BLD: 32 % (ref 24–43)
MCH RBC QN AUTO: 31.1 PG (ref 25.2–33.5)
MCHC RBC AUTO-ENTMCNC: 33.7 G/DL (ref 28.4–34.8)
MCV RBC AUTO: 92.4 FL (ref 82.6–102.9)
MONOCYTES # BLD: 10 % (ref 3–12)
NRBC AUTOMATED: 0 PER 100 WBC
PDW BLD-RTO: 12.6 % (ref 11.8–14.4)
PLATELET # BLD: 317 K/UL (ref 138–453)
PMV BLD AUTO: 9.1 FL (ref 8.1–13.5)
POTASSIUM SERPL-SCNC: 4.4 MMOL/L (ref 3.7–5.3)
RBC # BLD: 4.98 M/UL (ref 3.95–5.11)
SEG NEUTROPHILS: 55 % (ref 36–65)
SEGMENTED NEUTROPHILS ABSOLUTE COUNT: 4.51 K/UL (ref 1.5–8.1)
SODIUM BLD-SCNC: 140 MMOL/L (ref 135–144)
TOTAL PROTEIN: 7.5 G/DL (ref 6.4–8.3)
TRIGL SERPL-MCNC: 177 MG/DL
WBC # BLD: 8.2 K/UL (ref 3.5–11.3)

## 2022-11-30 PROCEDURE — 85025 COMPLETE CBC W/AUTO DIFF WBC: CPT

## 2022-11-30 PROCEDURE — 36415 COLL VENOUS BLD VENIPUNCTURE: CPT

## 2022-11-30 PROCEDURE — 80061 LIPID PANEL: CPT

## 2022-11-30 PROCEDURE — 80053 COMPREHEN METABOLIC PANEL: CPT

## 2023-07-07 DIAGNOSIS — Z12.31 ENCOUNTER FOR SCREENING MAMMOGRAM FOR MALIGNANT NEOPLASM OF BREAST: Primary | ICD-10-CM

## 2023-07-10 ENCOUNTER — HOSPITAL ENCOUNTER (OUTPATIENT)
Dept: MAMMOGRAPHY | Age: 73
Discharge: HOME | End: 2023-07-12
Payer: MEDICARE

## 2023-07-10 DIAGNOSIS — Z12.31 ENCOUNTER FOR SCREENING MAMMOGRAM FOR MALIGNANT NEOPLASM OF BREAST: ICD-10-CM

## 2023-07-10 PROCEDURE — 77063 BREAST TOMOSYNTHESIS BI: CPT

## 2023-08-07 ENCOUNTER — HOSPITAL ENCOUNTER (OUTPATIENT)
Age: 73
Discharge: HOME | End: 2023-08-07
Payer: MEDICARE

## 2023-08-07 LAB
ALBUMIN SERPL-MCNC: 4.4 G/DL (ref 3.5–5.2)
ALBUMIN/GLOB SERPL: 1.5 {RATIO} (ref 1–2.5)
ALP SERPL-CCNC: 111 U/L (ref 35–104)
ALT SERPL-CCNC: 20 U/L (ref 5–33)
ANION GAP SERPL CALCULATED.3IONS-SCNC: 17 MMOL/L (ref 9–17)
AST SERPL-CCNC: 21 U/L
BASOPHILS # BLD: 0.1 K/UL (ref 0–0.2)
BASOPHILS NFR BLD: 1 % (ref 0–2)
BILIRUB SERPL-MCNC: 0.2 MG/DL (ref 0.3–1.2)
BUN SERPL-MCNC: 17 MG/DL (ref 8–23)
CALCIUM SERPL-MCNC: 9.7 MG/DL (ref 8.6–10.4)
CHLORIDE SERPL-SCNC: 105 MMOL/L (ref 98–107)
CHOLEST SERPL-MCNC: 178 MG/DL
CHOLESTEROL/HDL RATIO: 3.6
CO2 SERPL-SCNC: 22 MMOL/L (ref 20–31)
CREAT SERPL-MCNC: 0.6 MG/DL (ref 0.5–0.9)
EOSINOPHIL # BLD: 0.22 K/UL (ref 0–0.44)
EOSINOPHILS RELATIVE PERCENT: 3 % (ref 1–4)
ERYTHROCYTE [DISTWIDTH] IN BLOOD BY AUTOMATED COUNT: 12.7 % (ref 11.8–14.4)
GFR SERPL CREATININE-BSD FRML MDRD: >60 ML/MIN/1.73M2
GLUCOSE SERPL-MCNC: 95 MG/DL (ref 70–99)
HCT VFR BLD AUTO: 45.8 % (ref 36.3–47.1)
HDLC SERPL-MCNC: 50 MG/DL
HGB BLD-MCNC: 14.7 G/DL (ref 11.9–15.1)
IMM GRANULOCYTES # BLD AUTO: <0.03 K/UL (ref 0–0.3)
IMM GRANULOCYTES NFR BLD: 0 %
LDLC SERPL CALC-MCNC: 101 MG/DL (ref 0–130)
LYMPHOCYTES NFR BLD: 2.87 K/UL (ref 1.1–3.7)
LYMPHOCYTES RELATIVE PERCENT: 35 % (ref 24–43)
MCH RBC QN AUTO: 29.8 PG (ref 25.2–33.5)
MCHC RBC AUTO-ENTMCNC: 32.1 G/DL (ref 28.4–34.8)
MCV RBC AUTO: 92.7 FL (ref 82.6–102.9)
MONOCYTES NFR BLD: 0.7 K/UL (ref 0.1–1.2)
MONOCYTES NFR BLD: 9 % (ref 3–12)
NEUTROPHILS NFR BLD: 52 % (ref 36–65)
NEUTS SEG NFR BLD: 4.32 K/UL (ref 1.5–8.1)
NRBC BLD-RTO: 0 PER 100 WBC
PLATELET # BLD AUTO: 300 K/UL (ref 138–453)
PMV BLD AUTO: 9.4 FL (ref 8.1–13.5)
POTASSIUM SERPL-SCNC: 4.7 MMOL/L (ref 3.7–5.3)
PROT SERPL-MCNC: 7.4 G/DL (ref 6.4–8.3)
RBC # BLD AUTO: 4.94 M/UL (ref 3.95–5.11)
SODIUM SERPL-SCNC: 144 MMOL/L (ref 135–144)
TRIGL SERPL-MCNC: 135 MG/DL
WBC OTHER # BLD: 8.2 K/UL (ref 3.5–11.3)

## 2023-08-07 PROCEDURE — 80061 LIPID PANEL: CPT

## 2023-08-07 PROCEDURE — 85025 COMPLETE CBC W/AUTO DIFF WBC: CPT

## 2023-08-07 PROCEDURE — 80053 COMPREHEN METABOLIC PANEL: CPT

## 2023-08-07 PROCEDURE — 36415 COLL VENOUS BLD VENIPUNCTURE: CPT

## 2023-10-11 PROBLEM — I77.810 ASCENDING AORTA DILATATION (HCC): Status: ACTIVE | Noted: 2023-10-11

## 2023-10-27 NOTE — TELEPHONE ENCOUNTER
[employer] is calling to request a refill on the following medication(s):    Medication Request:  Requested Prescriptions     Pending Prescriptions Disp Refills    omeprazole (PRILOSEC) 40 MG delayed release capsule [Pharmacy Med Name: OMEPRAZOLE DR 40 MG CAPSULE] 90 capsule      Sig: TAKE ONE CAPSULE BY MOUTH DAILY       Last Visit Date (If Applicable):  Visit date not found    Next Visit Date:    Visit date not found

## 2023-10-30 RX ORDER — OMEPRAZOLE 40 MG/1
40 CAPSULE, DELAYED RELEASE ORAL DAILY
Qty: 90 CAPSULE | Refills: 3 | Status: SHIPPED | OUTPATIENT
Start: 2023-10-30

## 2023-11-27 RX ORDER — LOSARTAN POTASSIUM 25 MG/1
25 TABLET ORAL 2 TIMES DAILY
Qty: 180 TABLET | Refills: 3 | Status: SHIPPED | OUTPATIENT
Start: 2023-11-27

## 2023-11-27 NOTE — TELEPHONE ENCOUNTER
Horacio Candelaria is calling to request a refill on the following medication(s):    Medication Request:  Requested Prescriptions     Pending Prescriptions Disp Refills    losartan (COZAAR) 25 MG tablet [Pharmacy Med Name: LOSARTAN POTASSIUM 25 MG TAB] 180 tablet      Sig: TAKE ONE TABLET BY MOUTH TWICE A DAY       Last Visit Date (If Applicable):  Visit date not found    Next Visit Date:    2/26/2024

## 2024-01-22 ENCOUNTER — OFFICE VISIT (OUTPATIENT)
Age: 74
End: 2024-01-22
Payer: MEDICARE

## 2024-01-22 VITALS — HEIGHT: 65 IN | WEIGHT: 209 LBS | BODY MASS INDEX: 34.82 KG/M2

## 2024-01-22 DIAGNOSIS — M17.12 OSTEOARTHRITIS OF LEFT KNEE, UNSPECIFIED OSTEOARTHRITIS TYPE: ICD-10-CM

## 2024-01-22 DIAGNOSIS — M25.562 LEFT KNEE PAIN, UNSPECIFIED CHRONICITY: Primary | ICD-10-CM

## 2024-01-22 PROCEDURE — 1036F TOBACCO NON-USER: CPT | Performed by: ORTHOPAEDIC SURGERY

## 2024-01-22 PROCEDURE — 1123F ACP DISCUSS/DSCN MKR DOCD: CPT | Performed by: ORTHOPAEDIC SURGERY

## 2024-01-22 PROCEDURE — G8427 DOCREV CUR MEDS BY ELIG CLIN: HCPCS | Performed by: ORTHOPAEDIC SURGERY

## 2024-01-22 PROCEDURE — G8484 FLU IMMUNIZE NO ADMIN: HCPCS | Performed by: ORTHOPAEDIC SURGERY

## 2024-01-22 PROCEDURE — G8399 PT W/DXA RESULTS DOCUMENT: HCPCS | Performed by: ORTHOPAEDIC SURGERY

## 2024-01-22 PROCEDURE — G8417 CALC BMI ABV UP PARAM F/U: HCPCS | Performed by: ORTHOPAEDIC SURGERY

## 2024-01-22 PROCEDURE — 1090F PRES/ABSN URINE INCON ASSESS: CPT | Performed by: ORTHOPAEDIC SURGERY

## 2024-01-22 PROCEDURE — 3017F COLORECTAL CA SCREEN DOC REV: CPT | Performed by: ORTHOPAEDIC SURGERY

## 2024-01-22 PROCEDURE — 99214 OFFICE O/P EST MOD 30 MIN: CPT | Performed by: ORTHOPAEDIC SURGERY

## 2024-01-22 NOTE — PROGRESS NOTES
[facility] Orthopedics & Sports Medicine      [facility] PHYSICIANS [facility], [facility]  [facility] LIN[facility] ORTHOPAEDICS AND SPORTS MEDICINE  [address]  [place] [address]  Dept: [phone]  Dept Fax: [phone]    Chief Compliant:  Chief Complaint   Patient presents with    Knee Pain     L knee        History of Present Illness:  This is a pleasant 73 y.o. female who is here for evaluation of left knee pain.  I have seen her in the past for left knee osteoarthritis.  The pain is significantly worse.  She heard and felt a pop yesterday morning when she was getting out of bed.  This significantly exacerbated her pain.  She is now walking with a cane and is worried that she is going to fall whereas previously she was not.  She is been taking Advil twice a day.  She tries to limit this because she is on Eliquis for atrial fibrillation.  She has no history of diabetes or smoking.    Physical Exam: The left knee has a varus deformity, joint line tenderness, range of motion from 5 degrees short of full extension to 115 degrees of flexion with patellofemoral crepitus.  There is no instability with varus or valgus stress.  She has a trace effusion.  She has an antalgic gait pattern without her cane.    Imaging: X-rays standing 4 views of the left knee taken today show end-stage bone-on-bone tricompartmental joint space narrowing with subchondral cyst subchondral sclerosis and osteophyte formation.      Assessment and Plan:    This is a pleasant 73 y.o. female who has end-stage bone-on-bone left knee osteoarthritis.  This is significantly worse.  She is now having recurrent flareups despite multiple previous cortisone injections.  She is taking anti-inflammatories intermittently even though she is on Eliquis.  She previously has been through a physician directed home exercise program with no significant relief.  At this point I recommend left total knee replacement.  This is really her only

## 2024-02-01 ENCOUNTER — TELEPHONE (OUTPATIENT)
Age: 74
End: 2024-02-01

## 2024-02-01 DIAGNOSIS — Z01.818 PRE-OP EXAM: Primary | ICD-10-CM

## 2024-02-01 DIAGNOSIS — M17.12 OSTEOARTHRITIS OF LEFT KNEE, UNSPECIFIED OSTEOARTHRITIS TYPE: ICD-10-CM

## 2024-02-01 NOTE — TELEPHONE ENCOUNTER
Ailyn has been scheduled for sx on 2/21.  I called and talked to her today.  She already knew about PAT from [employer].  She plans to go to the class since it has been 4 years since her other knee.  She already has a walker.  Instructions are in the mail.

## 2024-02-06 ENCOUNTER — HOSPITAL ENCOUNTER (OUTPATIENT)
Dept: PREADMISSION TESTING | Age: 74
Discharge: HOME | End: 2024-02-10
Payer: MEDICARE

## 2024-02-06 VITALS
BODY MASS INDEX: 34.66 KG/M2 | SYSTOLIC BLOOD PRESSURE: 141 MMHG | HEIGHT: 65 IN | WEIGHT: 208 LBS | DIASTOLIC BLOOD PRESSURE: 66 MMHG | HEART RATE: 66 BPM | RESPIRATION RATE: 16 BRPM | OXYGEN SATURATION: 96 %

## 2024-02-06 LAB
ANION GAP SERPL CALCULATED.3IONS-SCNC: 9 MMOL/L (ref 9–17)
BUN SERPL-MCNC: 18 MG/DL (ref 8–23)
CALCIUM SERPL-MCNC: 9.6 MG/DL (ref 8.6–10.4)
CHLORIDE SERPL-SCNC: 103 MMOL/L (ref 98–107)
CO2 SERPL-SCNC: 28 MMOL/L (ref 20–31)
CREAT SERPL-MCNC: 0.5 MG/DL (ref 0.5–0.9)
ERYTHROCYTE [DISTWIDTH] IN BLOOD BY AUTOMATED COUNT: 14 % (ref 12.5–15.4)
GFR SERPL CREATININE-BSD FRML MDRD: >60 ML/MIN/1.73M2
GLUCOSE SERPL-MCNC: 95 MG/DL (ref 70–99)
HCT VFR BLD AUTO: 43.1 % (ref 36–46)
HGB BLD-MCNC: 14.4 G/DL (ref 12–16)
MCH RBC QN AUTO: 29.3 PG (ref 26–34)
MCHC RBC AUTO-ENTMCNC: 33.4 G/DL (ref 31–37)
MCV RBC AUTO: 87.7 FL (ref 80–100)
PLATELET # BLD AUTO: 315 K/UL (ref 140–450)
PMV BLD AUTO: 6.9 FL (ref 6–12)
POTASSIUM SERPL-SCNC: 4.5 MMOL/L (ref 3.7–5.3)
RBC # BLD AUTO: 4.92 M/UL (ref 4–5.2)
SODIUM SERPL-SCNC: 140 MMOL/L (ref 135–144)
WBC OTHER # BLD: 8.2 K/UL (ref 3.5–11)

## 2024-02-06 PROCEDURE — 80048 BASIC METABOLIC PNL TOTAL CA: CPT

## 2024-02-06 PROCEDURE — 87641 MR-STAPH DNA AMP PROBE: CPT

## 2024-02-06 PROCEDURE — 85027 COMPLETE CBC AUTOMATED: CPT

## 2024-02-06 PROCEDURE — 93005 ELECTROCARDIOGRAM TRACING: CPT | Performed by: ORTHOPAEDIC SURGERY

## 2024-02-06 PROCEDURE — 36415 COLL VENOUS BLD VENIPUNCTURE: CPT

## 2024-02-06 ASSESSMENT — PAIN DESCRIPTION - PAIN TYPE: TYPE: CHRONIC PAIN

## 2024-02-06 ASSESSMENT — PAIN DESCRIPTION - ONSET: ONSET: AWAKENED FROM SLEEP

## 2024-02-06 ASSESSMENT — PAIN DESCRIPTION - LOCATION: LOCATION: KNEE

## 2024-02-06 ASSESSMENT — PAIN DESCRIPTION - ORIENTATION: ORIENTATION: LEFT

## 2024-02-06 ASSESSMENT — PAIN DESCRIPTION - DESCRIPTORS: DESCRIPTORS: SPASM;JABBING

## 2024-02-06 ASSESSMENT — PAIN SCALES - GENERAL: PAINLEVEL_OUTOF10: 4

## 2024-02-06 ASSESSMENT — PAIN DESCRIPTION - FREQUENCY: FREQUENCY: CONTINUOUS

## 2024-02-06 ASSESSMENT — PAIN - FUNCTIONAL ASSESSMENT: PAIN_FUNCTIONAL_ASSESSMENT: PREVENTS OR INTERFERES WITH ALL ACTIVE AND SOME PASSIVE ACTIVITIES

## 2024-02-06 NOTE — DISCHARGE INSTRUCTIONS
ARRIVE AT [facility] [place], [place] [address]  [phone] On ****date2-21-24 at *** time 0530-0545am    Enter through entrance C. Check-In is at the surgery registration desk straight through the doors.     If you have been given a blood band, you must bring it with you the day of surgery.     Continue to take your home medications as you normally do up to and including the night before surgery with the exception of any blood thinning medications.     Please stop any blood thinning medications as directed by your surgeon or prescribing physician. Failure to stop certain medications may interfere with your scheduled surgery.    These may include:  Aspirin, Warfarin (Coumadin), Clopidogrel (Plavix), Ibuprofen (Motrin, Advil), Naproxen (Aleve), Meloxicam (Mobic), Celecoxib (Celebrex), Eliquis, Pradaxa, Xarelto, Effient, Fish Oil, Herbal supplements.  HOLD ELIQUIS AS PER SURGEON AND prescribing physician      If you are diabetic, do not take any of your diabetic medications by mouth the morning of surgery.  If you are taking insulin contact the doctor that manages your diabetes for instructions about any changes to your insulin dosages the day before surgery.    Do not inject insulin or other injectable diabetic medications the morning of surgery unless otherwise instructed by the doctor who manages your diabetes.       Please take the following medication(s) the day of surgery with a small sips of water: ____Omeprazole, Losartan, Losartan, Metoprolol ______________    Please use your inhalers at home the day of surgery. USE Symbicort- Bring inhalers with you.        PREPARING FOR YOUR SURGERY:      Before surgery, you can play an important role in your own health. Because skin is not sterile, we need to be sure that your skin is as free of germs as possible before surgery by carefully washing before surgery.  Preparing or “prepping” skin before surgery can reduce the risk of a

## 2024-02-07 ENCOUNTER — TELEPHONE (OUTPATIENT)
Age: 74
End: 2024-02-07

## 2024-02-07 ENCOUNTER — CASE MANAGEMENT (OUTPATIENT)
Age: 74
End: 2024-02-07

## 2024-02-07 LAB
EKG ATRIAL RATE: 63 BPM
EKG P AXIS: 40 DEGREES
EKG P-R INTERVAL: 186 MS
EKG Q-T INTERVAL: 404 MS
EKG QRS DURATION: 84 MS
EKG QTC CALCULATION (BAZETT): 413 MS
EKG R AXIS: -48 DEGREES
EKG T AXIS: 67 DEGREES
EKG VENTRICULAR RATE: 63 BPM
MRSA, DNA, NASAL: NEGATIVE
SPECIMEN DESCRIPTION: NORMAL

## 2024-02-09 NOTE — PROGRESS NOTES
[facility] Joint Replacement Pre-surgical Assessment    Scheduled Surgery Date: 24  Surgery Time: 730    Surgeon: Dr. Lopez  Procedure: left Total Knee    Primary Insurance Coverage Medicare A&B  Pre-op class attended 24    PCP: Dallas Rowland MD  Clearance received by PCP: Yes    Anticipated Discharge Plan: home  Agency (if applicable): oppt    Significant PMH:   Surgical History    Procedure Laterality Date Comment Source   ACROMIOPLASTY Left 2016 ARTHROSCOPY SHOULDER WITH SAD EXCISION LOOSE BODIES AND DEBRIDEMENT LABRAL TEAR    APPENDECTOMY       BACK SURGERY   for herniation    BLADDER SURGERY   dilation     SECTION, LOW TRANSVERSE  1973     CHOLECYSTECTOMY  2012 lap    COLONOSCOPY   x2    ECTOPIC PREGNANCY SURGERY   right side    ENDOSCOPY, COLON, DIAGNOSTIC   egd x2.    JOINT REPLACEMENT Right 2019 total knee    KNEE ARTHROSCOPY  10/2010 left knee    KNEE ARTHROSCOPY   right knee    SKIN BIOPSY       TUBAL LIGATION   bilateral              Medical History    Diagnosis Date Comment Source   Arthritis  knees and spine    Asthma      Atrial fibrillation (HCC) 2022     Cancer (HCC)  skin    Chest pain  hx. of , due to stress , acid reflux acting up, esophogeal spasm,    Esophageal spasm  hx. 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    Osteopenia 2017 dexa scan    PONV (postoperative nausea and vomiting)  with every surgery    Seizure (HCC)  prior pt of Dr. To- NO seizure since - no longer on medications    Wears glasses                 Smoking history: none    Alcohol history: Current alcohol use: occasional wine    Concerns prior to surgery: none at this time. Pt has a cane and a FWW    Electronically signed by: Anitha Arzate RN on 2024 at 9:55 AM

## 2024-02-14 ENCOUNTER — OFFICE VISIT (OUTPATIENT)
Age: 74
End: 2024-02-14
Payer: MEDICARE

## 2024-02-14 VITALS
WEIGHT: 207.4 LBS | DIASTOLIC BLOOD PRESSURE: 80 MMHG | SYSTOLIC BLOOD PRESSURE: 130 MMHG | TEMPERATURE: 97.3 F | OXYGEN SATURATION: 94 % | HEART RATE: 88 BPM | BODY MASS INDEX: 34.51 KG/M2 | RESPIRATION RATE: 14 BRPM

## 2024-02-14 DIAGNOSIS — K21.9 GERD WITHOUT ESOPHAGITIS: ICD-10-CM

## 2024-02-14 DIAGNOSIS — J45.20 MILD INTERMITTENT ASTHMA WITHOUT COMPLICATION: Primary | ICD-10-CM

## 2024-02-14 DIAGNOSIS — I10 ESSENTIAL HYPERTENSION: ICD-10-CM

## 2024-02-14 DIAGNOSIS — M17.11 ARTHRITIS OF RIGHT KNEE: ICD-10-CM

## 2024-02-14 PROCEDURE — 1036F TOBACCO NON-USER: CPT | Performed by: FAMILY MEDICINE

## 2024-02-14 PROCEDURE — 1123F ACP DISCUSS/DSCN MKR DOCD: CPT | Performed by: FAMILY MEDICINE

## 2024-02-14 PROCEDURE — 3075F SYST BP GE 130 - 139MM HG: CPT | Performed by: FAMILY MEDICINE

## 2024-02-14 PROCEDURE — G8399 PT W/DXA RESULTS DOCUMENT: HCPCS | Performed by: FAMILY MEDICINE

## 2024-02-14 PROCEDURE — 99214 OFFICE O/P EST MOD 30 MIN: CPT | Performed by: FAMILY MEDICINE

## 2024-02-14 PROCEDURE — G8484 FLU IMMUNIZE NO ADMIN: HCPCS | Performed by: FAMILY MEDICINE

## 2024-02-14 PROCEDURE — 3017F COLORECTAL CA SCREEN DOC REV: CPT | Performed by: FAMILY MEDICINE

## 2024-02-14 PROCEDURE — G8417 CALC BMI ABV UP PARAM F/U: HCPCS | Performed by: FAMILY MEDICINE

## 2024-02-14 PROCEDURE — 1090F PRES/ABSN URINE INCON ASSESS: CPT | Performed by: FAMILY MEDICINE

## 2024-02-14 PROCEDURE — G8427 DOCREV CUR MEDS BY ELIG CLIN: HCPCS | Performed by: FAMILY MEDICINE

## 2024-02-14 PROCEDURE — 3079F DIAST BP 80-89 MM HG: CPT | Performed by: FAMILY MEDICINE

## 2024-02-14 NOTE — PROGRESS NOTES
of Food in the Last Year: Never true     Ran Out of Food in the Last Year: Never true   Transportation Needs: Unknown (2/12/2024)    PRAPARE - Transportation     Lack of Transportation (Non-Medical): No   Housing Stability: Unknown (2/12/2024)    Housing Stability Vital Sign     Unstable Housing in the Last Year: No        PHYSICAL EXAM     /80   Pulse 88   Temp 97.3 °F (36.3 °C)   Resp 14   Wt 94.1 kg (207 lb 6.4 oz)   SpO2 94%   BMI 34.51 kg/m²      General:A & O x3, No acute distress  HEENT:  NC/AT, PERRL, EOMI, TM clear bilaterally, no pharyngeal erythema, no mass palpated on neck exam  Lymph Nodes:  There is no lymphadenopathy in the cervical, supraclavicular, infraclavicular  Heart: S1+S2, no murmurs, RRR, no carotid bruits  Lungs: clear to auscultation without wheezes or rales  Abdomen: soft, nontender, no guarding, no rebound, no masses, or hernias  Extremity: Normal, without deformities, edema, or skin discoloration  SKIN: Skin color, texture, turgor normal. No rashes or lesions.  Psych:  Mood and affect normal  Back: ROM within normal limits, no tenderness    PREVENTATIVE CARE         LABS     Hospital Outpatient Visit on 02/06/2024   Component Date Value    Ventricular Rate 02/06/2024 63     Atrial Rate 02/06/2024 63     P-R Interval 02/06/2024 186     QRS Duration 02/06/2024 84     Q-T Interval 02/06/2024 404     QTc Calculation (Bazett) 02/06/2024 413     P Axis 02/06/2024 40     R Axis 02/06/2024 -48     T Axis 02/06/2024 67     Sodium 02/06/2024 140     Potassium 02/06/2024 4.5     Chloride 02/06/2024 103     CO2 02/06/2024 28     Anion Gap 02/06/2024 9     Glucose 02/06/2024 95     BUN 02/06/2024 18     Creatinine 02/06/2024 0.5     Est, Glom Filt Rate 02/06/2024 >60     Calcium 02/06/2024 9.6     WBC 02/06/2024 8.2     RBC 02/06/2024 4.92     Hemoglobin 02/06/2024 14.4     Hematocrit 02/06/2024 43.1     MCV 02/06/2024 87.7     MCH 02/06/2024 29.3     MCHC 02/06/2024 33.4     RDW

## 2024-02-20 ENCOUNTER — ANESTHESIA EVENT (OUTPATIENT)
Dept: OPERATING ROOM | Age: 74
End: 2024-02-20
Payer: MEDICARE

## 2024-02-21 ENCOUNTER — ANESTHESIA (OUTPATIENT)
Dept: OPERATING ROOM | Age: 74
End: 2024-02-21
Payer: MEDICARE

## 2024-02-21 ENCOUNTER — HOSPITAL ENCOUNTER (OUTPATIENT)
Age: 74
Discharge: HOME | End: 2024-02-21
Attending: ORTHOPAEDIC SURGERY | Admitting: ORTHOPAEDIC SURGERY
Payer: MEDICARE

## 2024-02-21 VITALS
WEIGHT: 210 LBS | DIASTOLIC BLOOD PRESSURE: 69 MMHG | TEMPERATURE: 97.5 F | RESPIRATION RATE: 12 BRPM | OXYGEN SATURATION: 91 % | HEIGHT: 65 IN | BODY MASS INDEX: 34.99 KG/M2 | HEART RATE: 69 BPM | SYSTOLIC BLOOD PRESSURE: 101 MMHG

## 2024-02-21 DIAGNOSIS — G89.18 POST-OP PAIN: Primary | ICD-10-CM

## 2024-02-21 PROBLEM — M17.12 OSTEOARTHRITIS OF LEFT KNEE, UNSPECIFIED OSTEOARTHRITIS TYPE: Status: ACTIVE | Noted: 2024-02-21

## 2024-02-21 PROBLEM — M17.12 UNILATERAL PRIMARY OSTEOARTHRITIS, LEFT KNEE: Status: ACTIVE | Noted: 2024-02-21

## 2024-02-21 PROCEDURE — 7100000001 HC PACU RECOVERY - ADDTL 15 MIN: Performed by: ORTHOPAEDIC SURGERY

## 2024-02-21 PROCEDURE — 2500000003 HC RX 250 WO HCPCS: Performed by: ANESTHESIOLOGY

## 2024-02-21 PROCEDURE — 97530 THERAPEUTIC ACTIVITIES: CPT

## 2024-02-21 PROCEDURE — 7100000010 HC PHASE II RECOVERY - FIRST 15 MIN: Performed by: ORTHOPAEDIC SURGERY

## 2024-02-21 PROCEDURE — 7100000000 HC PACU RECOVERY - FIRST 15 MIN: Performed by: ORTHOPAEDIC SURGERY

## 2024-02-21 PROCEDURE — 3600000015 HC SURGERY LEVEL 5 ADDTL 15MIN: Performed by: ORTHOPAEDIC SURGERY

## 2024-02-21 PROCEDURE — 97162 PT EVAL MOD COMPLEX 30 MIN: CPT

## 2024-02-21 PROCEDURE — 3600000005 HC SURGERY LEVEL 5 BASE: Performed by: ORTHOPAEDIC SURGERY

## 2024-02-21 PROCEDURE — 97116 GAIT TRAINING THERAPY: CPT

## 2024-02-21 PROCEDURE — 6360000002 HC RX W HCPCS

## 2024-02-21 PROCEDURE — C1776 JOINT DEVICE (IMPLANTABLE): HCPCS | Performed by: ORTHOPAEDIC SURGERY

## 2024-02-21 PROCEDURE — 97535 SELF CARE MNGMENT TRAINING: CPT

## 2024-02-21 PROCEDURE — 2709999900 HC NON-CHARGEABLE SUPPLY: Performed by: ORTHOPAEDIC SURGERY

## 2024-02-21 PROCEDURE — 64999 UNLISTED PX NERVOUS SYSTEM: CPT | Performed by: ANESTHESIOLOGY

## 2024-02-21 PROCEDURE — 3700000000 HC ANESTHESIA ATTENDED CARE: Performed by: ORTHOPAEDIC SURGERY

## 2024-02-21 PROCEDURE — 2580000003 HC RX 258: Performed by: ANESTHESIOLOGY

## 2024-02-21 PROCEDURE — 64447 NJX AA&/STRD FEMORAL NRV IMG: CPT | Performed by: ANESTHESIOLOGY

## 2024-02-21 PROCEDURE — 6370000000 HC RX 637 (ALT 250 FOR IP)

## 2024-02-21 PROCEDURE — 6360000002 HC RX W HCPCS: Performed by: NURSE PRACTITIONER

## 2024-02-21 PROCEDURE — 97110 THERAPEUTIC EXERCISES: CPT

## 2024-02-21 PROCEDURE — 2580000003 HC RX 258: Performed by: SPECIALIST

## 2024-02-21 PROCEDURE — 3700000001 HC ADD 15 MINUTES (ANESTHESIA): Performed by: ORTHOPAEDIC SURGERY

## 2024-02-21 PROCEDURE — 6360000002 HC RX W HCPCS: Performed by: ORTHOPAEDIC SURGERY

## 2024-02-21 PROCEDURE — 2580000003 HC RX 258: Performed by: NURSE PRACTITIONER

## 2024-02-21 PROCEDURE — 7100000011 HC PHASE II RECOVERY - ADDTL 15 MIN: Performed by: ORTHOPAEDIC SURGERY

## 2024-02-21 PROCEDURE — 6360000002 HC RX W HCPCS: Performed by: SPECIALIST

## 2024-02-21 PROCEDURE — C1713 ANCHOR/SCREW BN/BN,TIS/BN: HCPCS | Performed by: ORTHOPAEDIC SURGERY

## 2024-02-21 PROCEDURE — 97165 OT EVAL LOW COMPLEX 30 MIN: CPT

## 2024-02-21 PROCEDURE — 2500000003 HC RX 250 WO HCPCS: Performed by: SPECIALIST

## 2024-02-21 DEVICE — IMPLANTABLE DEVICE: Type: IMPLANTABLE DEVICE | Site: KNEE | Status: FUNCTIONAL

## 2024-02-21 DEVICE — TRAY TIB L71MM KNEE CO CHROM FIN MOD INTLOK VANGUARD: Type: IMPLANTABLE DEVICE | Site: KNEE | Status: FUNCTIONAL

## 2024-02-21 DEVICE — COMPONENT PAT 31MM KNEE ARCM 1 PEG W/ WIRE FOR ONC SALV SYS: Type: IMPLANTABLE DEVICE | Site: KNEE | Status: FUNCTIONAL

## 2024-02-21 DEVICE — CEMENT BNE 40GM HI VISC RADPQ FOR REV SURG: Type: IMPLANTABLE DEVICE | Site: KNEE | Status: FUNCTIONAL

## 2024-02-21 DEVICE — BEARING TIB L71MM THK14MM KNEE ARCM ANT STBL MOD COMPLT: Type: IMPLANTABLE DEVICE | Site: KNEE | Status: FUNCTIONAL

## 2024-02-21 RX ORDER — EPINEPHRINE 1 MG/ML
INJECTION, SOLUTION INTRAMUSCULAR; SUBCUTANEOUS
Status: DISCONTINUED
Start: 2024-02-21 | End: 2024-02-21 | Stop reason: HOSPADM

## 2024-02-21 RX ORDER — FAMOTIDINE 10 MG/ML
INJECTION, SOLUTION INTRAVENOUS
Status: DISCONTINUED
Start: 2024-02-21 | End: 2024-02-21 | Stop reason: HOSPADM

## 2024-02-21 RX ORDER — LIDOCAINE HYDROCHLORIDE 10 MG/ML
INJECTION, SOLUTION INFILTRATION; PERINEURAL PRN
Status: DISCONTINUED | OUTPATIENT
Start: 2024-02-21 | End: 2024-02-21 | Stop reason: SDUPTHER

## 2024-02-21 RX ORDER — ACETAMINOPHEN 325 MG/1
650 TABLET ORAL EVERY 6 HOURS
Status: CANCELLED | OUTPATIENT
Start: 2024-02-21

## 2024-02-21 RX ORDER — BUPIVACAINE HYDROCHLORIDE AND EPINEPHRINE 5; 5 MG/ML; UG/ML
INJECTION, SOLUTION EPIDURAL; INTRACAUDAL; PERINEURAL
Status: COMPLETED | OUTPATIENT
Start: 2024-02-21 | End: 2024-02-21

## 2024-02-21 RX ORDER — SENNA AND DOCUSATE SODIUM 50; 8.6 MG/1; MG/1
1 TABLET, FILM COATED ORAL 2 TIMES DAILY
Status: CANCELLED | OUTPATIENT
Start: 2024-02-21

## 2024-02-21 RX ORDER — EPINEPHRINE IN SOD CHLOR,ISO 1 MG/10 ML
SYRINGE (ML) INTRAVENOUS
Status: DISCONTINUED
Start: 2024-02-21 | End: 2024-02-21 | Stop reason: HOSPADM

## 2024-02-21 RX ORDER — FENTANYL CITRATE 50 UG/ML
INJECTION, SOLUTION INTRAMUSCULAR; INTRAVENOUS
Status: COMPLETED
Start: 2024-02-21 | End: 2024-02-21

## 2024-02-21 RX ORDER — SCOLOPAMINE TRANSDERMAL SYSTEM 1 MG/1
1 PATCH, EXTENDED RELEASE TRANSDERMAL ONCE
Status: DISCONTINUED | OUTPATIENT
Start: 2024-02-21 | End: 2024-02-21

## 2024-02-21 RX ORDER — ROCURONIUM BROMIDE 10 MG/ML
INJECTION, SOLUTION INTRAVENOUS PRN
Status: DISCONTINUED | OUTPATIENT
Start: 2024-02-21 | End: 2024-02-21 | Stop reason: SDUPTHER

## 2024-02-21 RX ORDER — TRAMADOL HYDROCHLORIDE 50 MG/1
50 TABLET ORAL EVERY 4 HOURS PRN
Status: CANCELLED | OUTPATIENT
Start: 2024-02-21

## 2024-02-21 RX ORDER — APREPITANT 40 MG/1
CAPSULE ORAL
Status: COMPLETED
Start: 2024-02-21 | End: 2024-02-21

## 2024-02-21 RX ORDER — PROPOFOL 10 MG/ML
INJECTION, EMULSION INTRAVENOUS PRN
Status: DISCONTINUED | OUTPATIENT
Start: 2024-02-21 | End: 2024-02-21 | Stop reason: SDUPTHER

## 2024-02-21 RX ORDER — SODIUM CHLORIDE 0.9 % (FLUSH) 0.9 %
5-40 SYRINGE (ML) INJECTION EVERY 12 HOURS SCHEDULED
Status: DISCONTINUED | OUTPATIENT
Start: 2024-02-21 | End: 2024-02-21 | Stop reason: HOSPADM

## 2024-02-21 RX ORDER — CETIRIZINE HYDROCHLORIDE 10 MG/1
10 TABLET ORAL DAILY
Status: CANCELLED | OUTPATIENT
Start: 2024-02-21

## 2024-02-21 RX ORDER — SODIUM CHLORIDE 0.9 % (FLUSH) 0.9 %
5-40 SYRINGE (ML) INJECTION PRN
Status: DISCONTINUED | OUTPATIENT
Start: 2024-02-21 | End: 2024-02-21 | Stop reason: HOSPADM

## 2024-02-21 RX ORDER — DEXAMETHASONE SODIUM PHOSPHATE 10 MG/ML
INJECTION INTRAMUSCULAR; INTRAVENOUS
Status: COMPLETED
Start: 2024-02-21 | End: 2024-02-21

## 2024-02-21 RX ORDER — HYDROXYZINE HYDROCHLORIDE 10 MG/1
10 TABLET, FILM COATED ORAL EVERY 8 HOURS PRN
Status: CANCELLED | OUTPATIENT
Start: 2024-02-21

## 2024-02-21 RX ORDER — DEXAMETHASONE SODIUM PHOSPHATE 10 MG/ML
INJECTION, SOLUTION INTRAMUSCULAR; INTRAVENOUS PRN
Status: DISCONTINUED | OUTPATIENT
Start: 2024-02-21 | End: 2024-02-21 | Stop reason: SDUPTHER

## 2024-02-21 RX ORDER — MONTELUKAST SODIUM 10 MG/1
10 TABLET ORAL NIGHTLY
Status: CANCELLED | OUTPATIENT
Start: 2024-02-21

## 2024-02-21 RX ORDER — SODIUM CHLORIDE 9 MG/ML
INJECTION, SOLUTION INTRAVENOUS CONTINUOUS PRN
Status: DISCONTINUED | OUTPATIENT
Start: 2024-02-21 | End: 2024-02-21 | Stop reason: SDUPTHER

## 2024-02-21 RX ORDER — METOCLOPRAMIDE HYDROCHLORIDE 5 MG/ML
INJECTION INTRAMUSCULAR; INTRAVENOUS
Status: DISCONTINUED
Start: 2024-02-21 | End: 2024-02-21 | Stop reason: HOSPADM

## 2024-02-21 RX ORDER — MAGNESIUM HYDROXIDE/ALUMINUM HYDROXICE/SIMETHICONE 120; 1200; 1200 MG/30ML; MG/30ML; MG/30ML
15 SUSPENSION ORAL EVERY 6 HOURS PRN
Status: CANCELLED | OUTPATIENT
Start: 2024-02-21

## 2024-02-21 RX ORDER — SODIUM CHLORIDE 0.9 % (FLUSH) 0.9 %
5-40 SYRINGE (ML) INJECTION EVERY 12 HOURS SCHEDULED
Status: CANCELLED | OUTPATIENT
Start: 2024-02-21

## 2024-02-21 RX ORDER — FENTANYL CITRATE 50 UG/ML
INJECTION, SOLUTION INTRAMUSCULAR; INTRAVENOUS PRN
Status: DISCONTINUED | OUTPATIENT
Start: 2024-02-21 | End: 2024-02-21 | Stop reason: SDUPTHER

## 2024-02-21 RX ORDER — TRANEXAMIC ACID 650 MG/1
1950 TABLET ORAL
Status: DISCONTINUED | OUTPATIENT
Start: 2024-02-21 | End: 2024-02-21 | Stop reason: HOSPADM

## 2024-02-21 RX ORDER — MORPHINE SULFATE 2 MG/ML
2 INJECTION, SOLUTION INTRAMUSCULAR; INTRAVENOUS EVERY 5 MIN PRN
Status: DISCONTINUED | OUTPATIENT
Start: 2024-02-21 | End: 2024-02-21 | Stop reason: HOSPADM

## 2024-02-21 RX ORDER — ACETAMINOPHEN 500 MG
1000 TABLET ORAL EVERY 6 HOURS PRN
Qty: 30 TABLET | Refills: 0 | Status: SHIPPED | OUTPATIENT
Start: 2024-02-21

## 2024-02-21 RX ORDER — PANTOPRAZOLE SODIUM 40 MG/1
40 TABLET, DELAYED RELEASE ORAL
Status: CANCELLED | OUTPATIENT
Start: 2024-02-22

## 2024-02-21 RX ORDER — DOCUSATE SODIUM 100 MG/1
100 CAPSULE, LIQUID FILLED ORAL 2 TIMES DAILY
Qty: 60 CAPSULE | Refills: 0 | Status: SHIPPED | OUTPATIENT
Start: 2024-02-21 | End: 2024-03-22

## 2024-02-21 RX ORDER — DIPHENHYDRAMINE HYDROCHLORIDE 50 MG/ML
12.5 INJECTION INTRAMUSCULAR; INTRAVENOUS
Status: DISCONTINUED | OUTPATIENT
Start: 2024-02-21 | End: 2024-02-21 | Stop reason: HOSPADM

## 2024-02-21 RX ORDER — SODIUM CHLORIDE 9 MG/ML
INJECTION, SOLUTION INTRAVENOUS PRN
Status: DISCONTINUED | OUTPATIENT
Start: 2024-02-21 | End: 2024-02-21 | Stop reason: HOSPADM

## 2024-02-21 RX ORDER — ONDANSETRON 2 MG/ML
INJECTION INTRAMUSCULAR; INTRAVENOUS
Status: COMPLETED
Start: 2024-02-21 | End: 2024-02-21

## 2024-02-21 RX ORDER — NEOSTIGMINE METHYLSULFATE 5 MG/5 ML
SYRINGE (ML) INTRAVENOUS PRN
Status: DISCONTINUED | OUTPATIENT
Start: 2024-02-21 | End: 2024-02-21 | Stop reason: SDUPTHER

## 2024-02-21 RX ORDER — TRAMADOL HYDROCHLORIDE 50 MG/1
100 TABLET ORAL EVERY 4 HOURS PRN
Status: CANCELLED | OUTPATIENT
Start: 2024-02-21

## 2024-02-21 RX ORDER — LOSARTAN POTASSIUM 25 MG/1
25 TABLET ORAL 2 TIMES DAILY
Status: CANCELLED | OUTPATIENT
Start: 2024-02-21

## 2024-02-21 RX ORDER — LIDOCAINE HYDROCHLORIDE 10 MG/ML
1 INJECTION, SOLUTION EPIDURAL; INFILTRATION; INTRACAUDAL; PERINEURAL
Status: DISCONTINUED | OUTPATIENT
Start: 2024-02-21 | End: 2024-02-21 | Stop reason: HOSPADM

## 2024-02-21 RX ORDER — PREDNISONE 10 MG/1
10 TABLET ORAL DAILY
Qty: 1 TABLET | Refills: 0 | Status: SHIPPED | OUTPATIENT
Start: 2024-02-21 | End: 2024-02-22

## 2024-02-21 RX ORDER — SODIUM CHLORIDE 0.9 % (FLUSH) 0.9 %
5-40 SYRINGE (ML) INJECTION PRN
Status: CANCELLED | OUTPATIENT
Start: 2024-02-21

## 2024-02-21 RX ORDER — MIDAZOLAM HYDROCHLORIDE 2 MG/2ML
2 INJECTION, SOLUTION INTRAMUSCULAR; INTRAVENOUS
Status: DISCONTINUED | OUTPATIENT
Start: 2024-02-21 | End: 2024-02-21 | Stop reason: HOSPADM

## 2024-02-21 RX ORDER — SCOLOPAMINE TRANSDERMAL SYSTEM 1 MG/1
PATCH, EXTENDED RELEASE TRANSDERMAL
Status: DISCONTINUED
Start: 2024-02-21 | End: 2024-02-21 | Stop reason: HOSPADM

## 2024-02-21 RX ORDER — BUPIVACAINE HYDROCHLORIDE 5 MG/ML
INJECTION, SOLUTION EPIDURAL; INTRACAUDAL
Status: DISCONTINUED
Start: 2024-02-21 | End: 2024-02-21 | Stop reason: HOSPADM

## 2024-02-21 RX ORDER — M-VIT,TX,IRON,MINS/CALC/FOLIC 27MG-0.4MG
1 TABLET ORAL DAILY
Status: CANCELLED | OUTPATIENT
Start: 2024-02-21

## 2024-02-21 RX ORDER — ONDANSETRON 4 MG/1
4 TABLET, ORALLY DISINTEGRATING ORAL EVERY 8 HOURS PRN
Status: CANCELLED | OUTPATIENT
Start: 2024-02-21

## 2024-02-21 RX ORDER — DEXAMETHASONE SODIUM PHOSPHATE 10 MG/ML
10 INJECTION, SOLUTION INTRAMUSCULAR; INTRAVENOUS ONCE
Status: DISCONTINUED | OUTPATIENT
Start: 2024-02-21 | End: 2024-02-21 | Stop reason: HOSPADM

## 2024-02-21 RX ORDER — TRAMADOL HYDROCHLORIDE 50 MG/1
TABLET ORAL
Status: COMPLETED
Start: 2024-02-21 | End: 2024-02-21

## 2024-02-21 RX ORDER — ONDANSETRON 2 MG/ML
4 INJECTION INTRAMUSCULAR; INTRAVENOUS
Status: COMPLETED | OUTPATIENT
Start: 2024-02-21 | End: 2024-02-21

## 2024-02-21 RX ORDER — GLYCOPYRROLATE 0.2 MG/ML
INJECTION INTRAMUSCULAR; INTRAVENOUS PRN
Status: DISCONTINUED | OUTPATIENT
Start: 2024-02-21 | End: 2024-02-21 | Stop reason: SDUPTHER

## 2024-02-21 RX ORDER — ONDANSETRON 4 MG/1
4 TABLET, FILM COATED ORAL EVERY 6 HOURS PRN
Qty: 21 TABLET | Refills: 0 | Status: SHIPPED | OUTPATIENT
Start: 2024-02-21 | End: 2024-02-28

## 2024-02-21 RX ORDER — POLYETHYLENE GLYCOL 3350 17 G/17G
17 POWDER, FOR SOLUTION ORAL DAILY PRN
Status: CANCELLED | OUTPATIENT
Start: 2024-02-21

## 2024-02-21 RX ORDER — KETOROLAC TROMETHAMINE 15 MG/ML
15 INJECTION, SOLUTION INTRAMUSCULAR; INTRAVENOUS EVERY 6 HOURS
Status: CANCELLED | OUTPATIENT
Start: 2024-02-21 | End: 2024-02-24

## 2024-02-21 RX ORDER — PHENYLEPHRINE HCL IN 0.9% NACL 1 MG/10 ML
SYRINGE (ML) INTRAVENOUS PRN
Status: DISCONTINUED | OUTPATIENT
Start: 2024-02-21 | End: 2024-02-21 | Stop reason: SDUPTHER

## 2024-02-21 RX ORDER — ONDANSETRON 2 MG/ML
INJECTION INTRAMUSCULAR; INTRAVENOUS PRN
Status: DISCONTINUED | OUTPATIENT
Start: 2024-02-21 | End: 2024-02-21 | Stop reason: SDUPTHER

## 2024-02-21 RX ORDER — KETOROLAC TROMETHAMINE 30 MG/ML
INJECTION, SOLUTION INTRAMUSCULAR; INTRAVENOUS PRN
Status: DISCONTINUED | OUTPATIENT
Start: 2024-02-21 | End: 2024-02-21 | Stop reason: SDUPTHER

## 2024-02-21 RX ORDER — ONDANSETRON 2 MG/ML
4 INJECTION INTRAMUSCULAR; INTRAVENOUS EVERY 6 HOURS PRN
Status: CANCELLED | OUTPATIENT
Start: 2024-02-21

## 2024-02-21 RX ORDER — ACETAMINOPHEN 500 MG
1000 TABLET ORAL ONCE
Status: COMPLETED | OUTPATIENT
Start: 2024-02-21 | End: 2024-02-21

## 2024-02-21 RX ORDER — SODIUM CHLORIDE 9 MG/ML
INJECTION, SOLUTION INTRAVENOUS PRN
Status: CANCELLED | OUTPATIENT
Start: 2024-02-21

## 2024-02-21 RX ORDER — APREPITANT 40 MG/1
40 CAPSULE ORAL ONCE
Status: COMPLETED | OUTPATIENT
Start: 2024-02-21 | End: 2024-02-21

## 2024-02-21 RX ORDER — CEPHALEXIN 500 MG/1
500 CAPSULE ORAL 4 TIMES DAILY
Qty: 4 CAPSULE | Refills: 0 | Status: SHIPPED | OUTPATIENT
Start: 2024-02-21 | End: 2024-02-22

## 2024-02-21 RX ORDER — ACETAMINOPHEN 500 MG
TABLET ORAL
Status: COMPLETED
Start: 2024-02-21 | End: 2024-02-21

## 2024-02-21 RX ORDER — TRAMADOL HYDROCHLORIDE 50 MG/1
50 TABLET ORAL EVERY 8 HOURS PRN
Qty: 40 TABLET | Refills: 0 | Status: SHIPPED | OUTPATIENT
Start: 2024-02-21 | End: 2024-03-05

## 2024-02-21 RX ORDER — LABETALOL HYDROCHLORIDE 5 MG/ML
10 INJECTION, SOLUTION INTRAVENOUS
Status: DISCONTINUED | OUTPATIENT
Start: 2024-02-21 | End: 2024-02-21 | Stop reason: HOSPADM

## 2024-02-21 RX ORDER — CEFAZOLIN 2 G/1
INJECTION, POWDER, FOR SOLUTION INTRAMUSCULAR; INTRAVENOUS
Status: DISCONTINUED
Start: 2024-02-21 | End: 2024-02-21 | Stop reason: HOSPADM

## 2024-02-21 RX ORDER — TRAMADOL HYDROCHLORIDE 50 MG/1
50 TABLET ORAL ONCE
Status: COMPLETED | OUTPATIENT
Start: 2024-02-21 | End: 2024-02-21

## 2024-02-21 RX ORDER — ALBUTEROL SULFATE 90 UG/1
2 AEROSOL, METERED RESPIRATORY (INHALATION) EVERY 6 HOURS PRN
Status: CANCELLED | OUTPATIENT
Start: 2024-02-21

## 2024-02-21 RX ORDER — SODIUM CHLORIDE, SODIUM LACTATE, POTASSIUM CHLORIDE, CALCIUM CHLORIDE 600; 310; 30; 20 MG/100ML; MG/100ML; MG/100ML; MG/100ML
INJECTION, SOLUTION INTRAVENOUS CONTINUOUS
Status: DISCONTINUED | OUTPATIENT
Start: 2024-02-21 | End: 2024-02-21 | Stop reason: HOSPADM

## 2024-02-21 RX ORDER — MIDAZOLAM HYDROCHLORIDE 1 MG/ML
INJECTION INTRAMUSCULAR; INTRAVENOUS
Status: COMPLETED
Start: 2024-02-21 | End: 2024-02-21

## 2024-02-21 RX ADMIN — Medication 1000 MG: at 06:38

## 2024-02-21 RX ADMIN — MIDAZOLAM HYDROCHLORIDE 2 MG: 1 INJECTION, SOLUTION INTRAMUSCULAR; INTRAVENOUS at 07:07

## 2024-02-21 RX ADMIN — DEXAMETHASONE SODIUM PHOSPHATE 10 MG: 10 INJECTION INTRAMUSCULAR; INTRAVENOUS at 13:04

## 2024-02-21 RX ADMIN — FENTANYL CITRATE 50 MCG: 50 INJECTION, SOLUTION INTRAMUSCULAR; INTRAVENOUS at 09:54

## 2024-02-21 RX ADMIN — ONDANSETRON 4 MG: 2 INJECTION INTRAMUSCULAR; INTRAVENOUS at 10:01

## 2024-02-21 RX ADMIN — GLYCOPYRROLATE 0.6 MG: 0.2 INJECTION INTRAMUSCULAR; INTRAVENOUS at 09:30

## 2024-02-21 RX ADMIN — APREPITANT 40 MG: 40 CAPSULE ORAL at 06:40

## 2024-02-21 RX ADMIN — TRAMADOL HYDROCHLORIDE 50 MG: 50 TABLET, COATED ORAL at 10:11

## 2024-02-21 RX ADMIN — FENTANYL CITRATE 50 MCG: 50 INJECTION, SOLUTION INTRAMUSCULAR; INTRAVENOUS at 09:52

## 2024-02-21 RX ADMIN — ACETAMINOPHEN 1000 MG: 500 TABLET ORAL at 06:38

## 2024-02-21 RX ADMIN — Medication 3 MG: at 09:30

## 2024-02-21 RX ADMIN — Medication 100 MCG: at 09:28

## 2024-02-21 RX ADMIN — SODIUM CHLORIDE, POTASSIUM CHLORIDE, SODIUM LACTATE AND CALCIUM CHLORIDE: 600; 310; 30; 20 INJECTION, SOLUTION INTRAVENOUS at 06:26

## 2024-02-21 RX ADMIN — SODIUM CHLORIDE: 9 INJECTION, SOLUTION INTRAVENOUS at 08:19

## 2024-02-21 RX ADMIN — ROCURONIUM BROMIDE 50 MG: 10 INJECTION, SOLUTION INTRAVENOUS at 08:02

## 2024-02-21 RX ADMIN — FENTANYL CITRATE 100 MCG: 50 INJECTION INTRAMUSCULAR; INTRAVENOUS at 07:07

## 2024-02-21 RX ADMIN — FENTANYL CITRATE 100 MCG: 50 INJECTION, SOLUTION INTRAMUSCULAR; INTRAVENOUS at 08:02

## 2024-02-21 RX ADMIN — KETOROLAC TROMETHAMINE 15 MG: 30 INJECTION, SOLUTION INTRAMUSCULAR at 09:00

## 2024-02-21 RX ADMIN — Medication 100 MCG: at 08:12

## 2024-02-21 RX ADMIN — Medication 50 MCG: at 09:03

## 2024-02-21 RX ADMIN — DEXAMETHASONE SODIUM PHOSPHATE 10 MG: 10 INJECTION INTRAMUSCULAR; INTRAVENOUS at 08:02

## 2024-02-21 RX ADMIN — TRAMADOL HYDROCHLORIDE 50 MG: 50 TABLET ORAL at 10:11

## 2024-02-21 RX ADMIN — SODIUM CHLORIDE, PRESERVATIVE FREE 20 MG: 5 INJECTION INTRAVENOUS at 06:36

## 2024-02-21 RX ADMIN — BUPIVACAINE HYDROCHLORIDE AND EPINEPHRINE 10 ML: 5; 5 INJECTION, SOLUTION EPIDURAL; INTRACAUDAL; PERINEURAL at 07:13

## 2024-02-21 RX ADMIN — BUPIVACAINE HYDROCHLORIDE AND EPINEPHRINE BITARTRATE 20 ML: 5; .0091 INJECTION, SOLUTION EPIDURAL; INTRACAUDAL; PERINEURAL at 07:13

## 2024-02-21 RX ADMIN — CEFAZOLIN 2000 MG: 2 INJECTION, POWDER, FOR SOLUTION INTRAMUSCULAR; INTRAVENOUS at 08:09

## 2024-02-21 RX ADMIN — ONDANSETRON 4 MG: 2 INJECTION INTRAMUSCULAR; INTRAVENOUS at 08:58

## 2024-02-21 RX ADMIN — PROPOFOL 150 MG: 10 INJECTION, EMULSION INTRAVENOUS at 08:02

## 2024-02-21 RX ADMIN — Medication 50 MCG: at 09:00

## 2024-02-21 RX ADMIN — Medication 100 MCG: at 09:16

## 2024-02-21 RX ADMIN — LIDOCAINE HYDROCHLORIDE 40 MG: 10 INJECTION, SOLUTION INFILTRATION; PERINEURAL at 08:02

## 2024-02-21 ASSESSMENT — PAIN SCALES - GENERAL
PAINLEVEL_OUTOF10: 4
PAINLEVEL_OUTOF10: 5
PAINLEVEL_OUTOF10: 4
PAINLEVEL_OUTOF10: 5
PAINLEVEL_OUTOF10: 5

## 2024-02-21 ASSESSMENT — PAIN DESCRIPTION - LOCATION: LOCATION: KNEE

## 2024-02-21 ASSESSMENT — PAIN - FUNCTIONAL ASSESSMENT: PAIN_FUNCTIONAL_ASSESSMENT: 0-10

## 2024-02-21 ASSESSMENT — PAIN DESCRIPTION - PAIN TYPE: TYPE: SURGICAL PAIN

## 2024-02-21 ASSESSMENT — PAIN DESCRIPTION - ORIENTATION: ORIENTATION: LEFT

## 2024-02-21 ASSESSMENT — PAIN DESCRIPTION - DESCRIPTORS: DESCRIPTORS: ACHING

## 2024-02-21 NOTE — PROGRESS NOTES
Physical Therapy  Facility/Department: [facility] OR  Physical Therapy Initial Assessment    Name: Angy Lanza  : 1950  MRN: [id number]  Date of Service: 2024  No chief complaint on file.  OA L knee and underwent L TKA 24 by Dr. Lopez      Discharge Recommendations:  Patient would benefit from continued therapy after discharge   PT Equipment Recommendations  Equipment Needed: No      Patient Diagnosis(es): The encounter diagnosis was Post-op pain.  Past Medical History:  has a past medical history of Arthritis, Asthma, Atrial fibrillation (HCC), Cancer (HCC), Chest pain, Esophageal spasm, Fibromyalgia, Frequent UTI, Headache, Hiatal hernia, HTN (hypertension), Nerve damage, Osteopenia, PONV (postoperative nausea and vomiting), Seizure (HCC), and Wears glasses.  Past Surgical History:  has a past surgical history that includes Tubal ligation (); Ectopic pregnancy surgery ();  section, low transverse (); Appendectomy; Bladder surgery; back surgery; Knee arthroscopy (10/2010); Knee arthroscopy (); Colonoscopy; Endoscopy, colon, diagnostic; Acromioplasty (Left, 2016); Cholecystectomy (); joint replacement (Right, 2019); and skin biopsy.    Assessment   Body Structures, Functions, Activity Limitations Requiring Skilled Therapeutic Intervention: Decreased functional mobility ;Decreased ROM;Decreased balance;Decreased strength;Increased pain  Assessment: Pt normally walks without device but had to use cane about 3wks prior to admit for L knee pain. S/P L TKA 24, pt performed bed mobility with SBA on stretcher, progressed to supervision for transfers with RW and CGA gait WBAT L LE with walker for 200ft. She was able to go up/down 2 steps with tania rails and CGA  WBAT L LE sequence. Pt is safe to return home with . She will benefit from continued PT. Pt reports has out pt PT set up for 2wks.  Therapy Prognosis: Excellent  Decision Making: Medium

## 2024-02-21 NOTE — PROGRESS NOTES
Occupational Therapy  [facility]  Occupational Therapy Initial Assessment    Name: Angy Lanza  : 1950  MRN: [id number]  Date of Service: 2024    Discharge Recommendations:     OT Equipment Recommendations  Equipment Needed: No     24 Patient underwent L TKA by Dr Lopez.    Patient Diagnosis(es): The encounter diagnosis was Post-op pain.  Past Medical History:  has a past medical history of Arthritis, Asthma, Atrial fibrillation (HCC), Cancer (HCC), Chest pain, Esophageal spasm, Fibromyalgia, Frequent UTI, Headache, Hiatal hernia, HTN (hypertension), Nerve damage, Osteopenia, PONV (postoperative nausea and vomiting), Seizure (HCC), and Wears glasses.  Past Surgical History:  has a past surgical history that includes Tubal ligation (); Ectopic pregnancy surgery ();  section, low transverse (); Appendectomy; Bladder surgery; back surgery; Knee arthroscopy (10/2010); Knee arthroscopy (); Colonoscopy; Endoscopy, colon, diagnostic; Acromioplasty (Left, 2016); Cholecystectomy (); joint replacement (Right, 2019); and skin biopsy.           Assessment   Assessment: Patient demonstrated safe ADLs and functional mobility. Home with . No acute OT needs.  Prognosis: Good  Decision Making: Low Complexity  REQUIRES OT FOLLOW-UP: No  Activity Tolerance  Activity Tolerance: Patient Tolerated treatment well        Plan   Occupational Therapy Plan  Times Per Week: D/C acute OT     Restrictions  Restrictions/Precautions  Restrictions/Precautions: Weight Bearing  Required Braces or Orthoses?: No  Lower Extremity Weight Bearing Restrictions  Left Lower Extremity Weight Bearing: Weight Bearing As Tolerated  Position Activity Restriction  Other position/activity restrictions: L TKA by Dr. Lopez 24    Subjective   General  Patient assessed for rehabilitation services?: Yes  Family / Caregiver Present: Yes ()  Subjective  Subjective:

## 2024-02-21 NOTE — ANESTHESIA PRE PROCEDURE
Department of Anesthesiology  Preprocedure Note       Name:  Angy Lanza   Age:  73 y.o.  :  1950                                          MRN:  [id number]         Date:  2024      Surgeon: Surgeon(s):  René Lopez MD    Procedure: Procedure(s):  LEFT KNEE TOTAL ARTHROPLASTY WITH OLIVIA BIOMET    Medications prior to admission:   Prior to Admission medications    Medication Sig Start Date End Date Taking? Authorizing Provider   metoprolol tartrate (LOPRESSOR) 25 MG tablet TAKE ONE TABLET BY MOUTH TWICE A DAY 24   Jose Moreno MD   ELIQUIS 5 MG TABS tablet TAKE ONE TABLET BY MOUTH TWICE A DAY 24   Jose Moreno MD   losartan (COZAAR) 25 MG tablet TAKE ONE TABLET BY MOUTH TWICE A DAY 23   Dallas Rowland MD   omeprazole (PRILOSEC) 40 MG delayed release capsule TAKE ONE CAPSULE BY MOUTH DAILY 10/30/23   Dallas Rowland MD   SYMBICORT 160-4.5 MCG/ACT AERO  23   Josefina Higginbotham MD   vitamin D (CHOLECALCIFEROL) 1000 UNIT TABS tablet Take 1 tablet by mouth daily    Josefina Higginbotham MD   cetirizine (ZYRTEC) 10 MG tablet Take 1 tablet by mouth daily    Josefina Higginbotham MD   montelukast (SINGULAIR) 10 MG tablet Take 1 tablet by mouth nightly    Josefina Higginbotham MD   albuterol (PROVENTIL HFA;VENTOLIN HFA) 108 (90 BASE) MCG/ACT inhaler Inhale 2 puffs into the lungs every 6 hours as needed    Josefina Higginbotham MD   calcium carbonate-vitamin D 600-200 MG-UNIT TABS Take  by mouth.      Josefina Higginbotham MD   therapeutic multivitamin-minerals (THERAGRAN-M) tablet Take 1 tablet by mouth daily    Josefina Higginbotham MD   frovatriptan succinate (FROVA) 2.5 MG TABS Take 1 tablet by mouth as needed    Josefina Higginbotham MD       Current medications:    Current Facility-Administered Medications   Medication Dose Route Frequency Provider Last Rate Last Admin    lidocaine PF 1 % injection 1 mL  1 mL IntraDERmal Once PRN Sterling Calix MD lactated

## 2024-02-21 NOTE — PROGRESS NOTES
Orthopedic Coordinator Note    Patient s/p left total Knee replacement on 2/21/24    The following appointments are currently scheduled:    Post-op with surgeon 3/7/24 at 1030    Physical Therapy: [address] 2/23/24 at 1100      Wheeled walker order is not entered  Face to face documentation is n/a. Pt has a FWW.    DVT Prophylaxis: Eliquis 5 mg PO BID          Electronically signed by: Anitha Arzate RN on 2/21/2024 at 8:41 AM

## 2024-02-21 NOTE — OP NOTE
tissue was closed with Vicryl and the skin was closed with Monocryl and skin glue.  A sterile dry dressing was applied.  Sponge and needle counts were correct.    Postoperative plan: She will be weightbearing as tolerated.  Ancef and Keflex for antibiotic prophylaxis.  She can resume her home dose of Eliquis 5 mg p.o. twice daily starting on the morning of postoperative day 1.  Tramadol for pain control as she does not tolerate stronger opioids.  Discharged home today    Bernarda Durant CNP was a first assistant for this case and was directly involved with each stage of the procedure including positioning, assistance during the procedure, implant placement if needed, and closure if needed.  There was no orthopedic resident available to assist.      Electronically signed by René Lopez MD on 2/21/2024 at 9:25 AM

## 2024-02-21 NOTE — DISCHARGE INSTRUCTIONS
Leave the surgical bandage on for 5 days.  You may shower with the bandage on during the first 5 days. After the bandage is removed, you may shower with nothing over the incision. Gently dry the incision with a clean towel when done in the shower.  At that time you do not need to keep the incision covered, however you may cover the incision with a gauze bandage if you would like.  Do not submerge the incision in water.      Elevate the leg as much as possible.  Please get up and try to walk a short distance once an hour.  You can walk as much as you can tolerate.  Wear your compression socks during the day and take them off at night.      When resting, do not rest with your knee bent such as in a reclining chair. Do not put a pillow under your knee to keep the knee bent.  When resting we want your knee to be extended.     Get to your physical therapy appointments as scheduled.      ANY ORTHOPEDIC QUESTIONS OR OTHER CONCERNS YOU MAY CALL THE ORTHOPEDIC COORDINATOR:   GUMARO POWER, RN  [phone]  [email or web address]      DISCHARGE INSTRUCTIONS  Caring for yourself after joint replacement surgery (Total Hip and Total Knee Replacement)    Activity and Therapy  Receive physical therapy three times per week. (Pain medication one hour prior to therapy)   Perform PT exercises on own when not receiving home or outpatient PT.  Ideally exercises should be at least two times a day.  Increase level of activity and ambulation each day.  Perform deep breathing exercises daily.  Patient provides self-care when possible.  Work on Range of motion for Total knee patients.   No pillow under the knee for Total knee patients.  Elevate the surgical leg when seated.  No driving until cleared by surgeon      Diet:  Increase oral intake of fruits, fiber and water to prevent constipation.  Drink fluids frequently and take stool softeners to aid in bowel motility.  Increase protein intake/reduce high-sugar intake to help promote healing

## 2024-02-21 NOTE — ANESTHESIA PROCEDURE NOTES
Peripheral Block    Patient location during procedure: pre-op  Reason for block: post-op pain management and at surgeon's request  Start time: 2/21/2024 7:13 AM  End time: 2/21/2024 7:14 AM  Staffing  Performed: anesthesiologist   Anesthesiologist: Joya Ramirez MD  Performed by: Joya Ramirez MD  Authorized by: Joya Ramirez MD    Preanesthetic Checklist  Completed: patient identified, IV checked, site marked, risks and benefits discussed, surgical/procedural consents, equipment checked, pre-op evaluation, timeout performed, anesthesia consent given, oxygen available, monitors applied/VS acknowledged, fire risk safety assessment completed and verbalized and blood product R/B/A discussed and consented  Peripheral Block   Patient position: supine  Prep: ChloraPrep  Provider prep: mask and sterile gloves  Patient monitoring: cardiac monitor, continuous pulse ox, frequent blood pressure checks, IV access, oxygen and responsive to questions  Block type: Femoral  Adductor canal  Laterality: left  Injection technique: single-shot  Guidance: ultrasound guided    Needle   Needle type: insulated echogenic nerve stimulator needle   Needle gauge: 20 G  Needle localization: ultrasound guidance  Needle length: 10 cm  Assessment   Injection assessment: negative aspiration for heme, no paresthesia on injection and no intravascular symptoms  Paresthesia pain: none  Slow fractionated injection: yes  Hemodynamics: stable  Outcomes: uncomplicated and patient tolerated procedure well    Medications Administered  BUPivacaine 0.5%-EPINEPHrine injection [id number] - Perineural   10 mL - 2/21/2024 7:13:00 AM

## 2024-02-21 NOTE — H&P
ORTHOPEDIC PREOP HISTORY AND PHYSICAL      HPI / Chief Complaint  Angy Lanza is a 73 y.o. female who presents for left knee pain    Past Medical History  Angy  has a past medical history of Arthritis, Asthma, Atrial fibrillation (HCC), Cancer (HCC), Chest pain, Esophageal spasm, Fibromyalgia, Frequent UTI, Headache, Hiatal hernia, HTN (hypertension), Nerve damage, Osteopenia, PONV (postoperative nausea and vomiting), Seizure (HCC), and Wears glasses.    Past Surgical History  Angy  has a past surgical history that includes Tubal ligation (); Ectopic pregnancy surgery ();  section, low transverse (); Appendectomy; Bladder surgery; back surgery; Knee arthroscopy (10/2010); Knee arthroscopy (); Colonoscopy; Endoscopy, colon, diagnostic; Acromioplasty (Left, 2016); Cholecystectomy (); joint replacement (Right, 2019); and skin biopsy.    Current Medications  Current Facility-Administered Medications   Medication Dose Route Frequency Provider Last Rate Last Admin    lidocaine PF 1 % injection 1 mL  1 mL IntraDERmal Once PRN Sterling Calix MD        lactated ringers IV soln infusion   IntraVENous Continuous Sterling Calix  mL/hr at 24 0626 New Bag at 24 0626    sodium chloride flush 0.9 % injection 5-40 mL  5-40 mL IntraVENous 2 times per day Sterling Calix MD        sodium chloride flush 0.9 % injection 5-40 mL  5-40 mL IntraVENous PRN Sterling Calix MD        0.9 % sodium chloride infusion   IntraVENous PRN Streling Calix MD        dexAMETHasone (PF) (DECADRON) injection 10 mg  10 mg IntraVENous Once Truss, Bernarda, APRN - CNP        sodium chloride flush 0.9 % injection 5-40 mL  5-40 mL IntraVENous 2 times per day Truss, Bernarda, APRN - CNP        sodium chloride flush 0.9 % injection 5-40 mL  5-40 mL IntraVENous PRN Truss Bernarda, APRN - CNP        ceFAZolin (ANCEF) 2,000 mg in sodium chloride 0.9 % 50 mL IVPB (mini-bag)  2,000 mg

## 2024-02-21 NOTE — ANESTHESIA PROCEDURE NOTES
Peripheral Block    Patient location during procedure: pre-op  Reason for block: post-op pain management and at surgeon's request  Start time: 2/21/2024 7:13 AM  End time: 2/21/2024 7:10 AM  Staffing  Performed: anesthesiologist   Anesthesiologist: Joya Ramirez MD  Performed by: Joya Ramirez MD  Authorized by: Joya Ramirez MD    Preanesthetic Checklist  Completed: patient identified, IV checked, site marked, risks and benefits discussed, surgical/procedural consents, equipment checked, pre-op evaluation, timeout performed, anesthesia consent given, oxygen available, monitors applied/VS acknowledged, fire risk safety assessment completed and verbalized and blood product R/B/A discussed and consented  Peripheral Block   Patient position: supine  Prep: ChloraPrep  Provider prep: mask and sterile gloves  Patient monitoring: cardiac monitor, continuous pulse ox, frequent blood pressure checks, IV access, oxygen and responsive to questions  Block type: iPacks  Laterality: left  Injection technique: single-shot  Guidance: ultrasound guided    Needle   Needle type: insulated echogenic nerve stimulator needle   Needle gauge: 20 G  Needle localization: ultrasound guidance  Needle length: 10 cm  Assessment   Injection assessment: negative aspiration for heme, no paresthesia on injection and no intravascular symptoms  Paresthesia pain: none  Slow fractionated injection: yes  Hemodynamics: stable  Outcomes: uncomplicated and patient tolerated procedure well    Medications Administered  BUPivacaine 0.5%-EPINEPHrine injection [id number] - Perineural   20 mL - 2/21/2024 7:13:00 AM

## 2024-02-23 ENCOUNTER — HOSPITAL ENCOUNTER (OUTPATIENT)
Age: 74
Setting detail: THERAPIES SERIES
Discharge: HOME | End: 2024-02-23
Attending: ORTHOPAEDIC SURGERY
Payer: MEDICARE

## 2024-02-23 PROCEDURE — 97110 THERAPEUTIC EXERCISES: CPT

## 2024-02-23 PROCEDURE — 97162 PT EVAL MOD COMPLEX 30 MIN: CPT

## 2024-02-23 NOTE — CONSULTS
Leg Length Discrp [] [] []    Slumped Sitting [] [] []    Palpation [] [] []    Sensation [] [] []    Edema [] [] []    Neurological [] [] []    Patellar Mobility [] [] []    Patellar Orientation [] [] []    Gait [] [] [] Analysis:         FUNCTION Normal Difficult Unable   Sitting [] [] []   Standing [] [] []   Ambulation [] [] []   Groom/Dress [] [] []   Lift/Carry [] [] []   Stairs [] [] []   Bending [] [] []   Squat [] [] []   Kneel [] [] []     BALANCE/PROPRIOCEPTION              [] Not tested   Single leg stance       R                     L                                PAIN   Eyes open                             Sec.                  Sec                  .[]    Eyes closed                          Sec.                  Sec                  .[]        FUNCTIONAL TESTS PAIN NO PAIN COMMENTS   Step Test 4” [] []    6” [] []    8” [] []    Squat [] []        Comments:    Assessment: [] Patient would continue to benefit from skilled physical therapy services in order to: ***    Problem list, as detailed above:   [] ? Pain     [] ? ROM    [] ? Strength    [] ? Function:   [] ? Balance  [] Edema  [] Postural Deviations  [] Gait Deviations  [] Other      STG: (to be met in *** treatments)  ? Pain:  ? ROM:  ? Strength:  ? Function:  Independent with Home Exercise Programs  Demonstrate Knowledge of fall prevention  LTG: (to be met in *** treatments)                       Patient goals:      Functional Assessment Used:   Current Status: Score  Goal Status: Score    Evaluation Complexity:  History (Personal factors, comorbidities) [] 0 [] 1-2 [] 3+   Exam (limitations, restrictions) [] 1-2 [] 3 [] 4+   Clinical presentation (progression) [] Stable [] Evolving  [] Unstable   Decision Making [] Low [] Moderate [] High    [] Low Complexity [] Moderate Complexity [] High Complexity       Rehab Potential:  [] Good  [] Fair  [] Poor   Suggested Professional Referral:  [] No  [] Yes:  Barriers to Goal Achievement::  [] No

## 2024-02-26 ENCOUNTER — HOSPITAL ENCOUNTER (OUTPATIENT)
Age: 74
Setting detail: THERAPIES SERIES
Discharge: HOME | End: 2024-02-26
Attending: ORTHOPAEDIC SURGERY
Payer: MEDICARE

## 2024-02-26 PROCEDURE — 97110 THERAPEUTIC EXERCISES: CPT

## 2024-02-26 PROCEDURE — 97140 MANUAL THERAPY 1/> REGIONS: CPT

## 2024-02-26 NOTE — FLOWSHEET NOTE
[facility] Rehabilitation &  Therapy  [address], [address]  [facility] [address]  [phone]  F: [phone]     Physical Therapy Daily Treatment Note    Date:  2/26/2024  Physician: René Lopez MD                                 Insurance: [facility] (2/21/24)  MEDICARE/AETNA  VISITS BMN/FOLLOWS MDCR GUIDELINES   Medical Diagnosis: M17.12 (L) knee OA                 Rehab Codes: (L) hip pain M25.552  Onset date: 2/1/24 referral surgery 2/21/24                        Next Dr's appt.: 3/7/24    Visit# / total visits: 2/12:  Progress note for Medicare patient due at visit 10     Cancels/No Shows: 0/0    Subjective:    Pain:  [x] Yes  [] No Location: left knee   Pain Rating: (0-10 scale) 5/10  Pain altered Tx:  [x] No  [] Yes  Action:    Comments:   Pt states that she continues to use her bike at home, progressing activity as tolerated, icing and elevating prn for swelling control.       Objective:           Left knee AROM  0-90  Left knee AAROM  0-100  Left LE swelling noted with compression stocking on.  Pt educated on increasing ice and elevation frequency for improved swelling reduction.       Today’s Treatment:    Modalities: ice to anterior left knee with elevation 10 min post stretching    Precautions: N/A    Exercise Reps/ Time Weight/ Level Comments   SciFit  6 min  L1 For self-knee ROM    Heel/toe raises  X 20       Calf stretch at SM  20\" x 4        Hamstring stretch at stair 20\" x 3       Knee flexion stretch on 2nd step 20\" x 5        LAQ 2 x 10        Knee flexion with OP seated 15 x 5\"       Seated Right Knee PA Mobilizations Grade IV-  4x10 Seconds               Seated Right Knee Passive Flexion ROM Grade IV-- 4x10 Seconds               Supine Right Knee AP Mobilizations Grade IV- 4x10 Seconds               Supine Right Knee Passive Extension ROM Grade IV-- 4x10 Seconds                                Quad Sets, Ham Sets, SLR and Heel Slides 10 Each AAROM

## 2024-02-28 ENCOUNTER — HOSPITAL ENCOUNTER (OUTPATIENT)
Age: 74
Setting detail: THERAPIES SERIES
Discharge: HOME | End: 2024-02-28
Attending: ORTHOPAEDIC SURGERY
Payer: MEDICARE

## 2024-02-28 PROCEDURE — 97110 THERAPEUTIC EXERCISES: CPT

## 2024-02-28 PROCEDURE — 97140 MANUAL THERAPY 1/> REGIONS: CPT

## 2024-02-28 NOTE — FLOWSHEET NOTE
[facility] Rehabilitation &  Therapy  [address], [address]  [facility] [address]  [phone]  F: [phone]     Physical Therapy Daily Treatment Note    Date:  2/28/2024  Physician: René Lopez MD                                 Insurance: [facility] (2/21/24)  MEDICARE/AETNA  VISITS BMN/FOLLOWS MDCR GUIDELINES   Medical Diagnosis: M17.12 (L) knee OA                 Rehab Codes: (L) hip pain M25.552  Onset date: 2/1/24 referral surgery 2/21/24                        Next Dr's appt.: 3/7/24    Visit# / total visits: 3/12:  Progress note for Medicare patient due at visit 10     Cancels/No Shows: 0/0    Subjective:    Pain:  [x] Yes  [] No Location: left knee   Pain Rating: (0-10 scale) 3-4/10  Pain altered Tx:  [x] No  [] Yes  Action:    Comments:   Pt states she had increased left LE and knee swelling this morning. Sx have reduced some as the day progresses.  Pt reports compliance with HEP and swelling control measures.       Objective:           Left knee AROM  0-90  Left knee AAROM  0-105  Left LE swelling noted with compression stocking on.  Pt educated on increasing ice and elevation frequency for improved swelling reduction.       Today’s Treatment:    Modalities: ice to anterior left knee with elevation 10 min post stretching    Precautions: N/A    Exercise Reps/ Time Weight/ Level Comments   SciFit  6 min  L2 For self-knee ROM    Heel/toe raises  X 20       Calf stretch at SM  20\" x 4        Hamstring stretch at stair 20\" x 3       Knee flexion stretch on 2nd step 20\" x 5        3-way hip x10 0# Bilaterally at stall bars    LAQ 2 x 10        Resisted ham curls 2 x 10  5# tube    Knee flexion with OP seated 15 x 5\"       Seated Right Knee PA Mobilizations Grade IV-  4x10 Seconds               Seated Right Knee Passive Flexion ROM Grade IV-- 4x10 Seconds               Supine Right Knee AP Mobilizations Grade IV- 4x10 Seconds               Supine Right Knee Passive Extension

## 2024-03-01 ENCOUNTER — HOSPITAL ENCOUNTER (OUTPATIENT)
Age: 74
Setting detail: THERAPIES SERIES
Discharge: HOME | End: 2024-03-01
Attending: ORTHOPAEDIC SURGERY
Payer: MEDICARE

## 2024-03-01 PROCEDURE — 97140 MANUAL THERAPY 1/> REGIONS: CPT

## 2024-03-01 PROCEDURE — 97110 THERAPEUTIC EXERCISES: CPT

## 2024-03-01 NOTE — FLOWSHEET NOTE
flexion, extension  ? Function: ambulates with SC for community ambulation, able to ascend/descend 6\" stairs in clinic  Independent with Home Exercise Programs     LTG: (to be met in 12 treatments)  ? Pain: Improved pain levels to 0-1/10 to allow for improved functional tolerance grossly  ? ROM: improved ROM to 0-125 to help with functional mobility  ? Strength: 4+/5 knee flexion, extension; squats to 80 degrees to help with functional tolerance of getting to lower cabinets  ? Function: ambulates without (A) device for community ambulation, able to ascend/descend 8\" stairs in clinic  Independent with Home Exercise Programs    Plan: [x] Continue current frequency toward long and short term goals.    [x] Specific Instructions for subsequent treatments: progress POC as tolerated                          Frequency:  2-3 x/week for 12 visits         Time In: 1200           Time Out: 1250    Electronically signed by:  Neeraj Manley PT

## 2024-03-04 ENCOUNTER — HOSPITAL ENCOUNTER (OUTPATIENT)
Age: 74
Setting detail: THERAPIES SERIES
Discharge: HOME | End: 2024-03-04
Attending: ORTHOPAEDIC SURGERY
Payer: MEDICARE

## 2024-03-04 PROCEDURE — 97140 MANUAL THERAPY 1/> REGIONS: CPT

## 2024-03-04 PROCEDURE — 97110 THERAPEUTIC EXERCISES: CPT

## 2024-03-04 NOTE — FLOWSHEET NOTE
[facility] Rehabilitation &  Therapy  [address], [address]  [facility] [address]  [phone]  F: [phone]     Physical Therapy Daily Treatment Note    Date:  3/4/2024  Physician: René Lopez MD                                 Insurance: [facility] (2/21/24)  MEDICARE/AETNA  VISITS BMN/FOLLOWS MDCR GUIDELINES   Medical Diagnosis: M17.12 (L) knee OA                 Rehab Codes: (L) hip pain M25.552  Onset date: 2/1/24 referral surgery 2/21/24                        Next Dr's appt.: 3/7/24    Visit# / total visits: 5/12:  Progress note for Medicare patient due at visit 10     Cancels/No Shows: 0/0    Subjective:    Pain:  [x] Yes  [] No Location: left knee   Pain Rating: (0-10 scale) 3/10  Pain altered Tx:  [x] No  [] Yes  Action:    Comments:   Pt states that she feels her swelling has reduced some since her last PT session. Pain remains 3-4/10 on average.     Objective:   Presents to clinic ambulating with a std cane, good technique with minimal antalgia.  Some improvements in left knee AROM and AAROM with today's session.         ROM 3/4/24:  Left knee AROM  0-97  Left knee AAROM  0-105  Left LE swelling noted with compression stocking on.  Pt educated on increasing ice and elevation frequency for improved swelling reduction.       Today’s Treatment:    Modalities: ice to anterior left knee with elevation 10 min post stretching    Precautions: N/A    Exercise Reps/ Time Weight/ Level Comments   SciFit  6 min  L2 For self-knee ROM    Heel/toe raises  X 20       Calf stretch at SM  20\" x 4        Hamstring stretch at stair 20\" x 3       Knee flexion stretch on 2nd step 20\" x 5        3-way hip X15 (B) 0# Bilaterally at stall bars    LAQ 2 x 10        minisquats 15x     3 way hip 15x     Resisted ham curls 2 x 10  5# tube NP   Knee flexion with OP seated 15 x 5\"       Seated Right Knee PA Mobilizations Grade IV-  4x10 Seconds               Seated Right Knee Passive Flexion ROM

## 2024-03-06 ENCOUNTER — HOSPITAL ENCOUNTER (OUTPATIENT)
Age: 74
Setting detail: THERAPIES SERIES
Discharge: HOME | End: 2024-03-06
Attending: ORTHOPAEDIC SURGERY
Payer: MEDICARE

## 2024-03-06 PROCEDURE — 97140 MANUAL THERAPY 1/> REGIONS: CPT

## 2024-03-06 PROCEDURE — 97110 THERAPEUTIC EXERCISES: CPT

## 2024-03-06 NOTE — FLOWSHEET NOTE
[facility] Rehabilitation &  Therapy  [address], [address]  [facility] [address]  [phone]  F: [phone]     Physical Therapy Daily Treatment Note    Date:  3/6/2024  Physician: René Lopez MD                                 Insurance: [facility] (2/21/24)  MEDICARE/AETNA  VISITS BMN/FOLLOWS MDCR GUIDELINES   Medical Diagnosis: M17.12 (L) knee OA                 Rehab Codes: (L) hip pain M25.552  Onset date: 2/1/24 referral surgery 2/21/24                        Next Dr's appt.: 3/7/24    Visit# / total visits: 6/12:  Progress note for Medicare patient due at visit 10     Cancels/No Shows: 0/0    Subjective:    Pain:  [x] Yes  [] No Location: left anterior/patella region of knee   Pain Rating: (0-10 scale) 3/10  Pain altered Tx:  [x] No  [] Yes  Action:    Comments:   Pt reports some reduction of left knee swelling since last session.   Pain remains 3-4/10 on average, tolerance to daily functional activities improving.     Objective:   Presents to clinic ambulating with a std cane, good technique with minimal antalgia.  Some improvements in left knee AROM and AAROM with today's session.         ROM 3/6/24:  Left knee AROM  0-98  Left knee AAROM  0-108  Left LE swelling noted with compression stocking on.  Pt educated on increasing ice and elevation frequency for improved swelling reduction.       Today’s Treatment:    Modalities: ice to anterior left knee with elevation 10 min post stretching    Precautions: N/A    Exercise Reps/ Time Weight/ Level Comments   SciFit  6 min  L2 For self-knee ROM    Heel/toe raises  X 20       Calf stretch at SM  20\" x 4        Hamstring stretch at stair 20\" x 3       Knee flexion stretch on 2nd step 20\" x 5        3-way hip X15 (B) 0# Bilaterally at stall bars    LAQ 2 x 10   2#      minisquats 15x     3 way hip 15x     Resisted ham curls 2 x 10  5# tube    Knee flexion with OP seated 15 x 5\"       Seated Right Knee PA Mobilizations Grade IV-

## 2024-03-07 ENCOUNTER — OFFICE VISIT (OUTPATIENT)
Age: 74
End: 2024-03-07

## 2024-03-07 VITALS — HEIGHT: 65 IN | BODY MASS INDEX: 34.99 KG/M2 | WEIGHT: 210 LBS

## 2024-03-07 DIAGNOSIS — M25.562 LEFT KNEE PAIN, UNSPECIFIED CHRONICITY: Primary | ICD-10-CM

## 2024-03-07 DIAGNOSIS — Z96.659 STATUS POST TOTAL KNEE REPLACEMENT, UNSPECIFIED LATERALITY: ICD-10-CM

## 2024-03-07 PROCEDURE — 99024 POSTOP FOLLOW-UP VISIT: CPT | Performed by: ORTHOPAEDIC SURGERY

## 2024-03-07 NOTE — PROGRESS NOTES
[facility]      [facility] PHYSICIANS [facility], [facility]  [facility]IC[facility]  [address]  [place] [address]  Dept: [phone]  Dept Fax: [phone]    Chief Compliant:  Chief Complaint   Patient presents with    Post-Op Check     1st post op - LTKA        History of Present Illness:  This is a pleasant 73 y.o. female who is 2-week status post left total knee replacement.  She is doing well.  She notes swelling.  She is progressing in therapy.    Physical Exam: The left knee incision is well-healed, no erythema, no drainage.  Range of motion is from 3 degrees short of full extension and 95 degrees of flexion.  She ambulates well with a cane.  Her calf is soft.  There is minor swelling as expected.    Imaging: X-rays of the left knee 2 views taken today show good alignment of the prosthesis.      Assessment and Plan:    This is a pleasant 73 y.o. female who is status post the above.  She is progressing as expected.  Continue with therapy.  She is on Eliquis at baseline for her medical problems.  I will see her in 4 weeks.         Past History:    Current Outpatient Medications:     apixaban (ELIQUIS) 5 MG TABS tablet, Take 1 tablet by mouth 2 times daily, Disp: 180 tablet, Rfl: 2    docusate sodium (COLACE) 100 MG capsule, Take 1 capsule by mouth 2 times daily, Disp: 60 capsule, Rfl: 0    acetaminophen (TYLENOL) 500 MG tablet, Take 2 tablets by mouth every 6 hours as needed for Pain, Disp: 30 tablet, Rfl: 0    metoprolol tartrate (LOPRESSOR) 25 MG tablet, TAKE ONE TABLET BY MOUTH TWICE A DAY, Disp: 180 tablet, Rfl: 2    losartan (COZAAR) 25 MG tablet, TAKE ONE TABLET BY MOUTH TWICE A DAY, Disp: 180 tablet, Rfl: 3    omeprazole (PRILOSEC) 40 MG delayed release capsule, TAKE ONE CAPSULE BY MOUTH DAILY, Disp: 90 capsule, Rfl: 3    SYMBICORT 160-4.5 MCG/ACT AERO, , Disp: , Rfl:     vitamin D (CHOLECALCIFEROL) 1000 UNIT TABS

## 2024-03-08 ENCOUNTER — HOSPITAL ENCOUNTER (OUTPATIENT)
Age: 74
Setting detail: THERAPIES SERIES
Discharge: HOME | End: 2024-03-08
Attending: ORTHOPAEDIC SURGERY
Payer: MEDICARE

## 2024-03-08 PROCEDURE — 97140 MANUAL THERAPY 1/> REGIONS: CPT

## 2024-03-08 PROCEDURE — 97110 THERAPEUTIC EXERCISES: CPT

## 2024-03-08 NOTE — FLOWSHEET NOTE
curls 2 x 10  5# tube    Knee flexion with OP seated 15 x 5\"       Seated Right Knee PA Mobilizations Grade IV-  4x10 Seconds               Seated Right Knee Passive Flexion ROM Grade IV-- 4x10 Seconds               Supine Right Knee AP Mobilizations Grade IV- 4x10 Seconds               Supine Right Knee Passive Extension ROM Grade IV-- 4x10 Seconds                                                Response to treatment:  Pt tolerated exercises without increased complaints of pain.  Ice at the end reduced soreness per patient.    Treatment Charges: Mins Units   [x]  Modalities 10 0   [x]  Ther Exercise 30 2   [x]  Manual Therapy 15 1   []  Ther Activities     []  Neuro Re-ed     []  Vasocompression     [] Gait     []  Other     Total billable time 55 3       Assessment: [x] Progressing toward goals.  3/8/24 making progress grossly with decreased (A) during gait, better WB tolerance, better ROM and strength.    [] No change.     [] Other:  [x] Patient would continue to benefit from skilled physical therapy services in order to work  on ROM, strength, gait, functional tolerance of stairs and ambulation without (A) devbice.     Pt. Education:  [x] Plans/Goals, Risks/Benefits discussed  [x] Home exercise program    Method of Education: [x] Verbal  [] Demo  [x] Written copy in chart  Comprehension of Education:  [x] Verbalizes understanding.  [] Demonstrates understanding.  [] Needs Review.  [x] Demonstrates/verbalizes understanding of HEP/Ed previously given.  Add sheet-assisted supine knee flexion stretch to HEP 3/4/24  Verbal and teach-bask education provided      STG: (to be met in 8 treatments)  ? Pain: Improved pain levels to 1-2/10 to allow for improved functional tolerance grossly  ? ROM: improved ROM to 0-110 to help with functional mobility  ? Strength: 4-/5 knee flexion, extension  ? Function: ambulates with SC for community ambulation, able to ascend/descend 6\" stairs in clinic  Independent with Home Exercise

## 2024-03-13 ENCOUNTER — HOSPITAL ENCOUNTER (OUTPATIENT)
Age: 74
Setting detail: THERAPIES SERIES
Discharge: HOME | End: 2024-03-13
Attending: ORTHOPAEDIC SURGERY
Payer: MEDICARE

## 2024-03-13 PROCEDURE — 97140 MANUAL THERAPY 1/> REGIONS: CPT

## 2024-03-13 PROCEDURE — 97110 THERAPEUTIC EXERCISES: CPT

## 2024-03-13 NOTE — PROGRESS NOTES
(to be met in 8 treatments) Reassessed 3/13/24  ? Pain: Improved pain levels to 1-2/10 to allow for improved functional tolerance grossly. Progressing   ? ROM: improved ROM to 0-110 to help with functional mobility. Progressing  ? Strength: 4-/5 knee flexion, extension. MET  ? Function: ambulates with SC for community ambulation MET, able to ascend/descend 6\" stairs in clinic MET  Independent with Home Exercise Programs met     LTG: (to be met in 12 treatments)  ? Pain: Improved pain levels to 0-1/10 to allow for improved functional tolerance grossly  ? ROM: improved ROM to 0-125 to help with functional mobility  ? Strength: 4+/5 knee flexion, extension; squats to 80 degrees to help with functional tolerance of getting to lower cabinets  ? Function: ambulates without (A) device for community ambulation, able to ascend/descend 8\" stairs in clinic  Independent with Home Exercise Programs    Plan: [x] Continue current frequency toward long and short term goals.    [x] Specific Instructions for subsequent treatments: progress POC as tolerated                          Frequency:  2-3 x/week for 12 visits         Time In: 215         Time Out: 300    Electronically signed by:  Neeraj Manley, PT

## 2024-03-15 ENCOUNTER — HOSPITAL ENCOUNTER (OUTPATIENT)
Age: 74
Setting detail: THERAPIES SERIES
Discharge: HOME | End: 2024-03-15
Attending: ORTHOPAEDIC SURGERY
Payer: MEDICARE

## 2024-03-15 NOTE — PROGRESS NOTES
[facility]  [address], [address]  [facility] [place] [facility] [address]  [phone]  F: [phone]     Physical Therapy CXL/NS    Date:  3/15/2024  Physician: René Lopez MD                                 Insurance: [facility] (2/21/24)  MEDICARE/AETNA  VISITS BMN/FOLLOWS MDCR GUIDELINES   Medical Diagnosis: M17.12 (L) knee OA                 Rehab Codes: (L) hip pain M25.552  Onset date: 2/1/24 referral surgery 2/21/24                        Next Dr's appt.: 3/7/24  Visit# / total visits: 8/12:  Progress note for Medicare patient due at visit 10     Cancels/No Shows: 1/0      Patient cancelled today- migraine- on schedule for next week.    Electronically signed by:  Neeraj Manley PT

## 2024-03-18 ENCOUNTER — HOSPITAL ENCOUNTER (OUTPATIENT)
Age: 74
Setting detail: THERAPIES SERIES
Discharge: HOME | End: 2024-03-18
Attending: ORTHOPAEDIC SURGERY
Payer: MEDICARE

## 2024-03-18 PROCEDURE — 97140 MANUAL THERAPY 1/> REGIONS: CPT

## 2024-03-18 PROCEDURE — 97110 THERAPEUTIC EXERCISES: CPT

## 2024-03-18 NOTE — FLOWSHEET NOTE
improved functional tolerance grossly. Progressing   ? ROM: improved ROM to 0-110 to help with functional mobility. Progressing  ? Strength: 4-/5 knee flexion, extension. MET  ? Function: ambulates with SC for community ambulation MET, able to ascend/descend 6\" stairs in clinic MET  Independent with Home Exercise Programs met     LTG: (to be met in 12 treatments)  ? Pain: Improved pain levels to 0-1/10 to allow for improved functional tolerance grossly  ? ROM: improved ROM to 0-125 to help with functional mobility  ? Strength: 4+/5 knee flexion, extension; squats to 80 degrees to help with functional tolerance of getting to lower cabinets  ? Function: ambulates without (A) device for community ambulation, able to ascend/descend 8\" stairs in clinic  Independent with Home Exercise Programs    Plan: [x] Continue current frequency toward long and short term goals.    [x] Specific Instructions for subsequent treatments: progress POC as tolerated                          Frequency:  2-3 x/week for 12 visits         Time In: 1145        Time Out: 1240    Electronically signed by:  Manny Chisholm PTA

## 2024-03-20 ENCOUNTER — HOSPITAL ENCOUNTER (OUTPATIENT)
Age: 74
Setting detail: THERAPIES SERIES
Discharge: HOME | End: 2024-03-20
Attending: ORTHOPAEDIC SURGERY
Payer: MEDICARE

## 2024-03-20 PROCEDURE — 97110 THERAPEUTIC EXERCISES: CPT

## 2024-03-20 PROCEDURE — 97140 MANUAL THERAPY 1/> REGIONS: CPT

## 2024-03-20 NOTE — FLOWSHEET NOTE
2nd step 20\" x 5        Step-ups  X 10  6\"    Reciprocal stairs X 5  4\"    3-way hip X15 (B) 3# Bilaterally at stall bars    LAQ 2 x 15  2#      minisquats 15x     Heel slide 15x     Resisted ham curls 2 x 10  5# tube    Knee flexion with OP seated 15 x 5\"       Seated Right Knee PA Mobilizations Grade IV-  4x10 Seconds               Seated Right Knee Passive Flexion ROM Grade IV-- 4x10 Seconds               Supine Right Knee AP Mobilizations Grade IV- 4x10 Seconds               Supine Right Knee Passive Extension ROM Grade IV-- 4x10 Seconds                                                Response to treatment:  Pt tolerated session well, increased left knee flexion, performing reciprocal stairs with a 4\" step today.  Progressing well.     Treatment Charges: Mins Units   [x]  Modalities 10 0   [x]  Ther Exercise 30 2   [x]  Manual Therapy 15 1   []  Ther Activities     []  Neuro Re-ed     []  Vasocompression     [] Gait     []  Other     Total billable time 55 3       Assessment: [x] Progressing toward goals.  3/13/24 making progress grossly with decreased (A) during gait, better WB tolerance, better ROM and strength.  Better as far as stairs, especially ascending stairs.    [] No change.     [] Other:  [x] Patient would continue to benefit from skilled physical therapy services in order to work  on ROM, strength, gait, functional tolerance of stairs and ambulation without (A) devbice.     Pt. Education:  [x] Plans/Goals, Risks/Benefits discussed  [x] Home exercise program    Method of Education: [x] Verbal  [] Demo  [x] Written copy in chart  Comprehension of Education:  [x] Verbalizes understanding.  [] Demonstrates understanding.  [] Needs Review.  [x] Demonstrates/verbalizes understanding of HEP/Ed previously given.  Add sheet-assisted supine knee flexion stretch to HEP 3/4/24  Verbal and teach-bask education provided      STG: (to be met in 8 treatments) Reassessed 3/13/24  ? Pain: Improved pain levels to 1-2/10

## 2024-03-22 ENCOUNTER — APPOINTMENT (OUTPATIENT)
Age: 74
End: 2024-03-22
Attending: ORTHOPAEDIC SURGERY
Payer: MEDICARE

## 2024-03-25 ENCOUNTER — HOSPITAL ENCOUNTER (OUTPATIENT)
Age: 74
Setting detail: THERAPIES SERIES
Discharge: HOME | End: 2024-03-25
Attending: ORTHOPAEDIC SURGERY
Payer: MEDICARE

## 2024-03-25 PROCEDURE — 97110 THERAPEUTIC EXERCISES: CPT

## 2024-03-25 PROCEDURE — 97140 MANUAL THERAPY 1/> REGIONS: CPT

## 2024-03-25 NOTE — PROGRESS NOTES
[facility]  The[facility]  [address]  [place] [address]  [phone]  F: [phone]     Physical Therapy Daily Treatment Note/Progress Note     Date:  3/25/2024  Patient: Angy Lanza             : 1950                    MRN: [id number]   Physician: René Lopez MD                                 Insurance: [facility] (24)  MEDICARE/AETNA  VISITS BMN/FOLLOWS MDCR GUIDELINES   Medical Diagnosis: M17.12 (L) knee OA                 Rehab Codes: (L) hip pain M25.552    Onset date: 24 referral surgery 24                        Next Dr's appt.: 3/7/24    Visit# / total visits: 10/12:  Progress note for Medicare patient due at visit 10    Date range of services: 24 to 3/25/24       Cancels/No Shows: 0/0    Subjective:    Pain:  [x] Yes  [] No Location: left anterior/patella region of knee   Pain Rating: (0-10 scale) 1/10  Pain altered Tx:  [x] No  [] Yes  Action:    Comments:   Pt reports ave left knee pain /10.  Pt states that she has not attempted to walk on the grass due to the uneven surface.     Objective:  3/25/24 Presents to clinic ambulating without device minimal deviation.  Good left leg stability and ankle control with SLS on foam and gym mat.  Pt has the ability and control to attempt activities on grass and uneven surfaces as tolerated.       ROM 3/25/24:  Left knee AROM  0-110  Left knee AAROM  0-116  WOMAC 40/96 41% limited     Strength (I) SLR  Knee flexion, extension  4+/5 (L); (R) 4+/5 each      Stairs- Able to ascend stairs with consecutive pattern on a 6\" step.     Ambulation without device, no deviations.     Today’s Treatment:    Modalities: N/A pt will ice at home if needed     Precautions: N/A    Exercise Reps/ Time Weight/ Level Comments   SciFit  6 min  L2 For self-knee ROM    Heel/toe raises  X 20       Calf stretch at SM  20\" x 4        Hamstring stretch at stair 20\" x 3       Knee flexion stretch on 2nd

## 2024-03-27 ENCOUNTER — OFFICE VISIT (OUTPATIENT)
Age: 74
End: 2024-03-27
Payer: MEDICARE

## 2024-03-27 VITALS
BODY MASS INDEX: 35.39 KG/M2 | WEIGHT: 212.4 LBS | DIASTOLIC BLOOD PRESSURE: 82 MMHG | OXYGEN SATURATION: 95 % | HEIGHT: 65 IN | SYSTOLIC BLOOD PRESSURE: 136 MMHG | TEMPERATURE: 97.8 F | HEART RATE: 75 BPM

## 2024-03-27 DIAGNOSIS — I10 ESSENTIAL HYPERTENSION: ICD-10-CM

## 2024-03-27 DIAGNOSIS — Z96.652 HISTORY OF LEFT KNEE REPLACEMENT: Primary | ICD-10-CM

## 2024-03-27 DIAGNOSIS — E04.9 GOITER: ICD-10-CM

## 2024-03-27 DIAGNOSIS — E78.5 DYSLIPIDEMIA: ICD-10-CM

## 2024-03-27 DIAGNOSIS — I48.91 ATRIAL FIBRILLATION WITH RVR (HCC): ICD-10-CM

## 2024-03-27 DIAGNOSIS — R56.9 SEIZURE (HCC): ICD-10-CM

## 2024-03-27 PROCEDURE — 3079F DIAST BP 80-89 MM HG: CPT | Performed by: FAMILY MEDICINE

## 2024-03-27 PROCEDURE — G8417 CALC BMI ABV UP PARAM F/U: HCPCS | Performed by: FAMILY MEDICINE

## 2024-03-27 PROCEDURE — 3075F SYST BP GE 130 - 139MM HG: CPT | Performed by: FAMILY MEDICINE

## 2024-03-27 PROCEDURE — G8427 DOCREV CUR MEDS BY ELIG CLIN: HCPCS | Performed by: FAMILY MEDICINE

## 2024-03-27 PROCEDURE — G8399 PT W/DXA RESULTS DOCUMENT: HCPCS | Performed by: FAMILY MEDICINE

## 2024-03-27 PROCEDURE — 3017F COLORECTAL CA SCREEN DOC REV: CPT | Performed by: FAMILY MEDICINE

## 2024-03-27 PROCEDURE — G8484 FLU IMMUNIZE NO ADMIN: HCPCS | Performed by: FAMILY MEDICINE

## 2024-03-27 PROCEDURE — 99214 OFFICE O/P EST MOD 30 MIN: CPT | Performed by: FAMILY MEDICINE

## 2024-03-27 PROCEDURE — 1090F PRES/ABSN URINE INCON ASSESS: CPT | Performed by: FAMILY MEDICINE

## 2024-03-27 PROCEDURE — 1123F ACP DISCUSS/DSCN MKR DOCD: CPT | Performed by: FAMILY MEDICINE

## 2024-03-27 PROCEDURE — 1036F TOBACCO NON-USER: CPT | Performed by: FAMILY MEDICINE

## 2024-03-27 NOTE — PROGRESS NOTES
9/27/2024).    IKhanh, scribed for and in the presence of Dallas Rowland MD.     I, Dallas Rowland MD, personally performed the services described in this documentation after obtaining verbal consent from the patient, and the record is both accurate and complete.

## 2024-03-29 ENCOUNTER — HOSPITAL ENCOUNTER (OUTPATIENT)
Age: 74
Setting detail: THERAPIES SERIES
Discharge: HOME | End: 2024-03-29
Attending: ORTHOPAEDIC SURGERY
Payer: MEDICARE

## 2024-03-29 NOTE — FLOWSHEET NOTE
[facility]  [address]  [place] [address]  [phone]  F: [phone]     Physical Therapy Cancel/No Show note    Date: 3/29/2024  Patient: Angy Lanza  : 1950  MRN: [id number]    Cancels/No Shows to date:     For today's appointment patient:    [x]  Cancelled    [] Rescheduled appointment    [] No-show     Reason given by patient:    []  Patient ill    []  Conflicting appointment    [] No transportation      [] Conflict with work    [] No reason given    [] Weather related    [] COVID-19    [x] Other:      Comments:  Pt has a sick dog.       [x] Next appointment was confirmed    Electronically signed by: Manny Chisholm PTA

## 2024-04-01 ENCOUNTER — APPOINTMENT (OUTPATIENT)
Age: 74
End: 2024-04-01
Attending: ORTHOPAEDIC SURGERY
Payer: MEDICARE

## 2024-04-03 ENCOUNTER — HOSPITAL ENCOUNTER (OUTPATIENT)
Age: 74
Setting detail: THERAPIES SERIES
Discharge: HOME | End: 2024-04-03
Attending: ORTHOPAEDIC SURGERY
Payer: MEDICARE

## 2024-04-03 PROCEDURE — 97140 MANUAL THERAPY 1/> REGIONS: CPT

## 2024-04-03 PROCEDURE — 97110 THERAPEUTIC EXERCISES: CPT

## 2024-04-03 NOTE — PROGRESS NOTES
[facility] Rehabilitation &  Therapy  [address]  [place] [address]  [phone]  F: [phone]     Physical Therapy Daily Treatment Note     Date:  4/3/2024  Patient: Angy Lanza             : 1950                    MRN: [id number]   Physician: René Lopez MD                                 Insurance: [employer] (24)  MEDICARE/AETNA  VISITS BMN/FOLLOWS MDCR GUIDELINES   Medical Diagnosis: M17.12 (L) knee OA                 Rehab Codes: (L) hip pain M25.552    Onset date: 24 referral surgery 24                        Next Dr's appt.: 3/7/24    Visit# / total visits: :  Progress note for Medicare patient due at visit 10    Cancels/No Shows: 0/0    Subjective:    Pain:  [x] Yes  [] No Location: left anterior/patella region of knee   Pain Rating: (0-10 scale) 0-1/10  Pain altered Tx:  [x] No  [] Yes  Action:    Comments:   Pt reports ave left knee pain 0-1/10.  Pt states that she did some light yardwork with good tolerance-only a minor increase in knee discomfort.     Objective:        ROM 4/3/24:  Left knee AROM  0-110  Left knee AAROM  0-120    Previously Documented:  WOMAC 40/96 41% limited   Strength (I) SLR  Knee flexion, extension  4+/5 (L); (R) 4+/5 each   Stairs- Able to ascend stairs with consecutive pattern on a 6\" step.   Ambulation without device, no deviations noted    Today’s Treatment:    Modalities:ice anterior right knee with elevation 10 min post session.     Precautions: N/A    Exercise Reps/ Time Weight/ Level Comments   SciFit  6 min  L2 For self-knee ROM    Heel/toe raises  X 20       Calf stretch at SM  20\" x 4        Hamstring stretch at stair 20\" x 3       Knee flexion stretch on 2nd step 20\" x 5        SLS left knee on foam 30\" x 2  One hand-hold SBA, no hand-hold CGA   Step-ups  X 15 6\"    Reciprocal stairs X 10  4\"    3-way hip X15 (B) 4# Bilaterally at stall bars    marching X 20  4#    LAQ 2 x 15 4#

## 2024-04-04 ENCOUNTER — OFFICE VISIT (OUTPATIENT)
Age: 74
End: 2024-04-04

## 2024-04-04 VITALS — BODY MASS INDEX: 35.32 KG/M2 | WEIGHT: 212 LBS | HEIGHT: 65 IN

## 2024-04-04 DIAGNOSIS — Z96.659 STATUS POST TOTAL KNEE REPLACEMENT, UNSPECIFIED LATERALITY: Primary | ICD-10-CM

## 2024-04-04 PROCEDURE — 99024 POSTOP FOLLOW-UP VISIT: CPT | Performed by: ORTHOPAEDIC SURGERY

## 2024-04-04 NOTE — PROGRESS NOTES
true     Ran Out of Food in the Last Year: Never true   Transportation Needs: Unknown (2024)    PRAPARE - Transportation     Lack of Transportation (Medical): Not on file     Lack of Transportation (Non-Medical): No   Physical Activity: Not on file   Stress: Not on file   Social Connections: Not on file   Intimate Partner Violence: Not on file   Housing Stability: Unknown (2024)    Housing Stability Vital Sign     Unable to Pay for Housing in the Last Year: Not on file     Number of Places Lived in the Last Year: Not on file     Unstable Housing in the Last Year: No     Past Medical History:   Diagnosis Date    Arthritis     knees and spine    Asthma     Atrial fibrillation (HCC) 2022    Cancer (HCC)     skin    Chest pain     hx. of , due to stress , acid reflux acting up, esophogeal spasm,    Esophageal spasm     hx. 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    Osteopenia 2017    dexa scan    PONV (postoperative nausea and vomiting)     with every surgery    Seizure (HCC)     prior pt of Dr. To- NO seizure since - no longer on medications    Wears glasses      Past Surgical History:   Procedure Laterality Date    ACROMIOPLASTY Left 2016    ARTHROSCOPY SHOULDER WITH SAD EXCISION LOOSE BODIES AND DEBRIDEMENT LABRAL TEAR    APPENDECTOMY      BACK SURGERY      for herniation    BLADDER SURGERY      dilation     SECTION, LOW TRANSVERSE  1973    CHOLECYSTECTOMY      lap    COLONOSCOPY      x2    ECTOPIC PREGNANCY SURGERY  1988    right side    ENDOSCOPY, COLON, DIAGNOSTIC      egd x2.    JOINT REPLACEMENT Right 2019    total knee    KNEE ARTHROSCOPY  10/2010    left knee    KNEE ARTHROSCOPY      right knee    SKIN BIOPSY      TOTAL KNEE ARTHROPLASTY Left 2024    LEFT TOTAL KNEE ARTHROPLASTY WITH

## 2024-04-10 ENCOUNTER — HOSPITAL ENCOUNTER (OUTPATIENT)
Age: 74
Setting detail: THERAPIES SERIES
Discharge: HOME | End: 2024-04-10
Attending: ORTHOPAEDIC SURGERY
Payer: MEDICARE

## 2024-04-10 PROCEDURE — 97140 MANUAL THERAPY 1/> REGIONS: CPT

## 2024-04-10 PROCEDURE — 97110 THERAPEUTIC EXERCISES: CPT

## 2024-04-10 NOTE — PROGRESS NOTES
[facility]  [address]  [place] [facility] [address]  [phone]  F: [phone]     Physical Therapy Daily Treatment Note/PROGRESS NOTE    Date:  4/10/2024  Patient: Angy Lanza             : 1950                    MRN: [id number]    Physician: René Lopez MD                                 Insurance: LTKA (24)  MEDICARE/AETNA  VISITS BMN/FOLLOWS MDCR GUIDELINES   Medical Diagnosis: M17.12 (L) knee OA                 Rehab Codes: (L) hip pain M25.552  Onset date: 24 referral surgery 24                        Next Dr's appt.: 3/7/24  Visit# / total visits: :  Progress note for Medicare patient due at visit 10     Cancels/No Shows: 2/0  Reporting period 3/13/24-4/10/24    Subjective:    Pain:  [x] Yes  [] No Location: left anterior/patella region of knee   Pain Rating: (0-10 scale) 3-4/10  Pain altered Tx:  [x] No  [] Yes  Action:  Comments:   Pt planning discharge at this time.  Much improved ambulation tolerance without (A) device, much improved ROM and strength grossly at this time, driving (I).  Improved tolerance of stairs with some limitations with descending stairs at this time.      Objective:  3/13/24 Presents to clinic ambulating with a std cane, good technique with minimal antalgia.  Some improvements in left knee AROM and AAROM with today's session.         ROM 4/10/24:  Left knee AROM  0-110  Left knee AAROM  0-120     WOMAC 15/96 15% limited   Strength (I) SLR  Knee flexion, extension  4+/5 (L); (R) 4+/5 each     Ambulation without device, no deviations noted    Stairs- Able to ascend stairs with consecutive pattern, still limited with descending stairs.    Today’s Treatment:    Modalities: ice to anterior left knee with elevation 10 min post stretching    Precautions: N/A    Exercise Reps/ Time Weight/ Level Comments   SciFit  6 min  L2 For self-knee ROM    Heel/toe raises  X 20       Calf stretch at

## 2024-04-25 NOTE — TELEPHONE ENCOUNTER
Angy Lanza is calling to request a refill on the following medication(s):    Medication Request:  Requested Prescriptions     Pending Prescriptions Disp Refills    SYMBICORT 160-4.5 MCG/ACT AERO [Pharmacy Med Name: SYMBICORT 160-4.5 MCG INHALER] 10.2 g      Sig: INHALE 2 PUFFS BY MOUTH TWICE A DAY       Last Visit Date (If Applicable):  3/27/2024    Next Visit Date:    9/23/2024

## 2024-04-26 RX ORDER — BUDESONIDE AND FORMOTEROL FUMARATE DIHYDRATE 160; 4.5 UG/1; UG/1
2 AEROSOL RESPIRATORY (INHALATION) 2 TIMES DAILY
Qty: 10.2 G | Refills: 3 | Status: SHIPPED | OUTPATIENT
Start: 2024-04-26

## 2024-04-30 ENCOUNTER — TELEPHONE (OUTPATIENT)
Age: 74
End: 2024-04-30

## 2024-05-20 RX ORDER — MONTELUKAST SODIUM 10 MG/1
10 TABLET ORAL DAILY
Qty: 90 TABLET | Refills: 3 | Status: SHIPPED | OUTPATIENT
Start: 2024-05-20

## 2024-05-20 NOTE — TELEPHONE ENCOUNTER
Patient was told by Won that Dr TONG denied this refill    Patient wondering why?     She is almost now out of the medication

## 2024-05-20 NOTE — TELEPHONE ENCOUNTER
Angy Lanza is calling to request a refill on the following medication(s):    Medication Request:  Requested Prescriptions     Pending Prescriptions Disp Refills    montelukast (SINGULAIR) 10 MG tablet [Pharmacy Med Name: MONTELUKAST SOD 10 MG TABLET] 90 tablet      Sig: TAKE ONE TABLET BY MOUTH DAILY       Last Visit Date (If Applicable):  3/27/2024    Next Visit Date:    9/23/2024

## 2024-07-12 DIAGNOSIS — Z12.31 SCREENING MAMMOGRAM FOR BREAST CANCER: Primary | ICD-10-CM

## 2024-07-16 ENCOUNTER — HOSPITAL ENCOUNTER (OUTPATIENT)
Dept: MAMMOGRAPHY | Age: 74
Discharge: HOME | End: 2024-07-18
Payer: MEDICARE

## 2024-07-16 VITALS — HEIGHT: 65 IN | WEIGHT: 195 LBS | BODY MASS INDEX: 32.49 KG/M2

## 2024-07-16 DIAGNOSIS — Z12.31 SCREENING MAMMOGRAM FOR BREAST CANCER: ICD-10-CM

## 2024-07-16 PROCEDURE — 77063 BREAST TOMOSYNTHESIS BI: CPT

## 2024-08-06 NOTE — ANESTHESIA POSTPROCEDURE EVALUATION
Department of Anesthesiology  Postprocedure Note    Patient: Angy Lanza  MRN: [id number]  YOB: 1950  Date of evaluation: 2/21/2024    Procedure Summary       Date: 02/21/24 Room / Location: [address]    Anesthesia Start: 0758 Anesthesia Stop: 0957    Procedure: LEFT TOTAL KNEE ARTHROPLASTY WITH OLIVIA BIOMET (Left: Knee) Diagnosis:       Osteoarthritis of left knee, unspecified osteoarthritis type      (Osteoarthritis of left knee, unspecified osteoarthritis type [M17.12])    Surgeons: René Lopez MD Responsible Provider: Joya Ramirez MD    Anesthesia Type: general ASA Status: 3            Anesthesia Type: No value filed.    Ronak Phase I: Ronak Score: 10    Ronak Phase II: Ronak Score: 10    Anesthesia Post Evaluation    Patient location during evaluation: PACU  Patient participation: complete - patient participated  Level of consciousness: awake  Airway patency: patent  Nausea & Vomiting: no nausea and no vomiting  Cardiovascular status: blood pressure returned to baseline  Respiratory status: acceptable  Hydration status: euvolemic  Pain management: adequate    No notable events documented.  
Patient smoked for 1 year at age 18. Drinks beer on occasion denies recent use. Does not use illicit drugs.

## 2024-08-19 ENCOUNTER — TELEPHONE (OUTPATIENT)
Age: 74
End: 2024-08-19

## 2024-08-19 NOTE — TELEPHONE ENCOUNTER
Pt insurance will only pay for Sumatriptan   and she needs it refilled for the migraine headaches   Won Lott

## 2024-08-20 RX ORDER — SUMATRIPTAN 50 MG/1
50 TABLET, FILM COATED ORAL
Qty: 27 TABLET | Refills: 1 | Status: SHIPPED | OUTPATIENT
Start: 2024-08-20 | End: 2024-08-20

## 2024-08-26 RX ORDER — BUDESONIDE AND FORMOTEROL FUMARATE DIHYDRATE 160; 4.5 UG/1; UG/1
2 AEROSOL RESPIRATORY (INHALATION) 2 TIMES DAILY
Qty: 10.2 G | Refills: 3 | Status: SHIPPED | OUTPATIENT
Start: 2024-08-26

## 2024-08-26 NOTE — TELEPHONE ENCOUNTER
Angy Lanza is calling to request a refill on the following medication(s):    Medication Request:  Requested Prescriptions     Pending Prescriptions Disp Refills    SYMBICORT 160-4.5 MCG/ACT AERO [Pharmacy Med Name: SYMBICORT 160-4.5 MCG INHALER] 10.2 g 3     Sig: INHALE 2 PUFFS BY MOUTH TWICE A DAY       Last Visit Date (If Applicable):  3/27/2024    Next Visit Date:    9/23/2024

## 2024-09-10 ENCOUNTER — HOSPITAL ENCOUNTER (OUTPATIENT)
Age: 74
Discharge: HOME | End: 2024-09-10
Payer: MEDICARE

## 2024-09-10 LAB
ALBUMIN SERPL-MCNC: 4.3 G/DL (ref 3.5–5.2)
ALBUMIN/GLOB SERPL: 2 {RATIO} (ref 1–2.5)
ALP SERPL-CCNC: 118 U/L (ref 35–104)
ALT SERPL-CCNC: 16 U/L (ref 10–35)
ANION GAP SERPL CALCULATED.3IONS-SCNC: 9 MMOL/L (ref 9–16)
AST SERPL-CCNC: 23 U/L (ref 10–35)
BASOPHILS # BLD: 0.11 K/UL (ref 0–0.2)
BASOPHILS NFR BLD: 1 % (ref 0–2)
BILIRUB SERPL-MCNC: 0.3 MG/DL (ref 0–1.2)
BUN SERPL-MCNC: 16 MG/DL (ref 8–23)
CALCIUM SERPL-MCNC: 9.6 MG/DL (ref 8.6–10.4)
CHLORIDE SERPL-SCNC: 105 MMOL/L (ref 98–107)
CHOLEST SERPL-MCNC: 187 MG/DL (ref 0–199)
CHOLESTEROL/HDL RATIO: 4
CO2 SERPL-SCNC: 27 MMOL/L (ref 20–31)
CREAT SERPL-MCNC: 0.7 MG/DL (ref 0.5–0.9)
EOSINOPHIL # BLD: 0.31 K/UL (ref 0–0.44)
EOSINOPHILS RELATIVE PERCENT: 4 % (ref 1–4)
ERYTHROCYTE [DISTWIDTH] IN BLOOD BY AUTOMATED COUNT: 13.8 % (ref 11.8–14.4)
GFR, ESTIMATED: 89 ML/MIN/1.73M2
GLUCOSE SERPL-MCNC: 88 MG/DL (ref 74–99)
HCT VFR BLD AUTO: 43.1 % (ref 36.3–47.1)
HDLC SERPL-MCNC: 43 MG/DL
HGB BLD-MCNC: 13.7 G/DL (ref 11.9–15.1)
IMM GRANULOCYTES # BLD AUTO: <0.03 K/UL (ref 0–0.3)
IMM GRANULOCYTES NFR BLD: 0 %
LDLC SERPL CALC-MCNC: 107 MG/DL (ref 0–100)
LYMPHOCYTES NFR BLD: 3.01 K/UL (ref 1.1–3.7)
LYMPHOCYTES RELATIVE PERCENT: 36 % (ref 24–43)
MCH RBC QN AUTO: 28.1 PG (ref 25.2–33.5)
MCHC RBC AUTO-ENTMCNC: 31.8 G/DL (ref 28.4–34.8)
MCV RBC AUTO: 88.5 FL (ref 82.6–102.9)
MONOCYTES NFR BLD: 0.76 K/UL (ref 0.1–1.2)
MONOCYTES NFR BLD: 9 % (ref 3–12)
NEUTROPHILS NFR BLD: 50 % (ref 36–65)
NEUTS SEG NFR BLD: 4.08 K/UL (ref 1.5–8.1)
NRBC BLD-RTO: 0 PER 100 WBC
PLATELET # BLD AUTO: 284 K/UL (ref 138–453)
PMV BLD AUTO: 9.4 FL (ref 8.1–13.5)
POTASSIUM SERPL-SCNC: 4.4 MMOL/L (ref 3.7–5.3)
PROT SERPL-MCNC: 7 G/DL (ref 6.6–8.7)
RBC # BLD AUTO: 4.87 M/UL (ref 3.95–5.11)
SODIUM SERPL-SCNC: 141 MMOL/L (ref 136–145)
T4 FREE SERPL-MCNC: 1.1 NG/DL (ref 0.92–1.68)
TRIGL SERPL-MCNC: 185 MG/DL
TSH SERPL DL<=0.05 MIU/L-ACNC: 0.86 UIU/ML (ref 0.27–4.2)
VLDLC SERPL CALC-MCNC: 37 MG/DL
WBC OTHER # BLD: 8.3 K/UL (ref 3.5–11.3)

## 2024-09-10 PROCEDURE — 84443 ASSAY THYROID STIM HORMONE: CPT

## 2024-09-10 PROCEDURE — 80053 COMPREHEN METABOLIC PANEL: CPT

## 2024-09-10 PROCEDURE — 80061 LIPID PANEL: CPT

## 2024-09-10 PROCEDURE — 36415 COLL VENOUS BLD VENIPUNCTURE: CPT

## 2024-09-10 PROCEDURE — 84439 ASSAY OF FREE THYROXINE: CPT

## 2024-09-10 PROCEDURE — 85025 COMPLETE CBC W/AUTO DIFF WBC: CPT

## 2024-09-20 SDOH — HEALTH STABILITY: PHYSICAL HEALTH: ON AVERAGE, HOW MANY MINUTES DO YOU ENGAGE IN EXERCISE AT THIS LEVEL?: 10 MIN

## 2024-09-20 SDOH — HEALTH STABILITY: PHYSICAL HEALTH: ON AVERAGE, HOW MANY DAYS PER WEEK DO YOU ENGAGE IN MODERATE TO STRENUOUS EXERCISE (LIKE A BRISK WALK)?: 1 DAY

## 2024-09-20 ASSESSMENT — PATIENT HEALTH QUESTIONNAIRE - PHQ9
SUM OF ALL RESPONSES TO PHQ9 QUESTIONS 1 & 2: 1
SUM OF ALL RESPONSES TO PHQ QUESTIONS 1-9: 1
2. FEELING DOWN, DEPRESSED OR HOPELESS: NOT AT ALL
SUM OF ALL RESPONSES TO PHQ QUESTIONS 1-9: 1
1. LITTLE INTEREST OR PLEASURE IN DOING THINGS: SEVERAL DAYS
SUM OF ALL RESPONSES TO PHQ QUESTIONS 1-9: 1
SUM OF ALL RESPONSES TO PHQ QUESTIONS 1-9: 1

## 2024-09-20 ASSESSMENT — LIFESTYLE VARIABLES
HOW MANY STANDARD DRINKS CONTAINING ALCOHOL DO YOU HAVE ON A TYPICAL DAY: 1
HOW OFTEN DO YOU HAVE A DRINK CONTAINING ALCOHOL: 2-4 TIMES A MONTH
HOW OFTEN DO YOU HAVE A DRINK CONTAINING ALCOHOL: 3
HOW OFTEN DO YOU HAVE SIX OR MORE DRINKS ON ONE OCCASION: 1
HOW MANY STANDARD DRINKS CONTAINING ALCOHOL DO YOU HAVE ON A TYPICAL DAY: 1 OR 2

## 2024-09-23 ENCOUNTER — OFFICE VISIT (OUTPATIENT)
Age: 74
End: 2024-09-23
Payer: MEDICARE

## 2024-09-23 VITALS
WEIGHT: 201.5 LBS | DIASTOLIC BLOOD PRESSURE: 62 MMHG | SYSTOLIC BLOOD PRESSURE: 122 MMHG | OXYGEN SATURATION: 95 % | BODY MASS INDEX: 33.57 KG/M2 | HEART RATE: 68 BPM | HEIGHT: 65 IN

## 2024-09-23 DIAGNOSIS — J45.20 MILD INTERMITTENT ASTHMA WITHOUT COMPLICATION: ICD-10-CM

## 2024-09-23 DIAGNOSIS — I10 PRIMARY HYPERTENSION: ICD-10-CM

## 2024-09-23 DIAGNOSIS — M79.7 FIBROMYALGIA: ICD-10-CM

## 2024-09-23 DIAGNOSIS — E78.2 MIXED HYPERLIPIDEMIA: ICD-10-CM

## 2024-09-23 DIAGNOSIS — I48.91 ATRIAL FIBRILLATION WITH RVR (HCC): Primary | ICD-10-CM

## 2024-09-23 DIAGNOSIS — Z23 ENCOUNTER FOR IMMUNIZATION: ICD-10-CM

## 2024-09-23 DIAGNOSIS — Z00.00 ENCOUNTER FOR MEDICARE ANNUAL WELLNESS EXAM: ICD-10-CM

## 2024-09-23 PROCEDURE — G8399 PT W/DXA RESULTS DOCUMENT: HCPCS | Performed by: FAMILY MEDICINE

## 2024-09-23 PROCEDURE — G8417 CALC BMI ABV UP PARAM F/U: HCPCS | Performed by: FAMILY MEDICINE

## 2024-09-23 PROCEDURE — 3017F COLORECTAL CA SCREEN DOC REV: CPT | Performed by: FAMILY MEDICINE

## 2024-09-23 PROCEDURE — 3078F DIAST BP <80 MM HG: CPT | Performed by: FAMILY MEDICINE

## 2024-09-23 PROCEDURE — G8427 DOCREV CUR MEDS BY ELIG CLIN: HCPCS | Performed by: FAMILY MEDICINE

## 2024-09-23 PROCEDURE — 1036F TOBACCO NON-USER: CPT | Performed by: FAMILY MEDICINE

## 2024-09-23 PROCEDURE — 3074F SYST BP LT 130 MM HG: CPT | Performed by: FAMILY MEDICINE

## 2024-09-23 PROCEDURE — G0008 ADMIN INFLUENZA VIRUS VAC: HCPCS | Performed by: FAMILY MEDICINE

## 2024-09-23 PROCEDURE — G0439 PPPS, SUBSEQ VISIT: HCPCS | Performed by: FAMILY MEDICINE

## 2024-09-23 PROCEDURE — 90653 IIV ADJUVANT VACCINE IM: CPT | Performed by: FAMILY MEDICINE

## 2024-09-23 PROCEDURE — 1090F PRES/ABSN URINE INCON ASSESS: CPT | Performed by: FAMILY MEDICINE

## 2024-09-23 PROCEDURE — 1123F ACP DISCUSS/DSCN MKR DOCD: CPT | Performed by: FAMILY MEDICINE

## 2024-09-23 PROCEDURE — 99214 OFFICE O/P EST MOD 30 MIN: CPT | Performed by: FAMILY MEDICINE

## 2024-09-23 RX ORDER — ATORVASTATIN CALCIUM 10 MG/1
TABLET, FILM COATED ORAL
Qty: 30 TABLET | Refills: 3 | Status: SHIPPED | OUTPATIENT
Start: 2024-09-23

## 2024-10-01 ENCOUNTER — TELEPHONE (OUTPATIENT)
Age: 74
End: 2024-10-01

## 2024-10-01 NOTE — TELEPHONE ENCOUNTER
Patient called to report that since starting Lipitor 1.5 wks ago, she has developed really bad leg cramps and muscle spasms.  She said the pharmacist told her if she did develop these symptoms to stop taking and call her pcp, so she is. She said the cramps began almost right away, but have never stopped and are getting worse.  She took Lipitor yesterday, Mon but not today.  Will hold off taking more until Dr Rowland advises,  [phone]

## 2024-10-02 NOTE — TELEPHONE ENCOUNTER
Stay off lipitor  Call update about leg cramps in a few weeks  Bz will decide about possible new med at that time   (NOT A STATIN)

## 2024-10-11 RX ORDER — OMEPRAZOLE 40 MG/1
40 CAPSULE, DELAYED RELEASE ORAL DAILY
Qty: 90 CAPSULE | Refills: 3 | Status: SHIPPED | OUTPATIENT
Start: 2024-10-11

## 2024-10-11 NOTE — TELEPHONE ENCOUNTER
Patient called regarding her Omeprazole script (40mg DR caps, 1 daily) she requested renewal from at [facility].  She said Won told her that Dr Rowland denied it.      She is requesting renewal. Said she is supposed to be taking this.

## 2024-10-11 NOTE — TELEPHONE ENCOUNTER
Angy Lanza is calling to request a refill on the following medication(s):    Medication Request:  Requested Prescriptions     Pending Prescriptions Disp Refills    omeprazole (PRILOSEC) 40 MG delayed release capsule 90 capsule 3     Sig: Take 1 capsule by mouth daily       Last Visit Date (If Applicable):  9/23/2024    Next Visit Date:    3/24/2025

## 2024-10-31 ENCOUNTER — HOSPITAL ENCOUNTER (EMERGENCY)
Age: 74
Discharge: HOME | End: 2024-10-31
Attending: EMERGENCY MEDICINE
Payer: MEDICARE

## 2024-10-31 VITALS
HEART RATE: 92 BPM | BODY MASS INDEX: 31.65 KG/M2 | WEIGHT: 190 LBS | RESPIRATION RATE: 16 BRPM | SYSTOLIC BLOOD PRESSURE: 144 MMHG | OXYGEN SATURATION: 95 % | HEIGHT: 65 IN | DIASTOLIC BLOOD PRESSURE: 72 MMHG | TEMPERATURE: 98.5 F

## 2024-10-31 DIAGNOSIS — J20.9 ACUTE WHEEZY BRONCHITIS: ICD-10-CM

## 2024-10-31 DIAGNOSIS — S61.213A LACERATION OF LEFT MIDDLE FINGER WITHOUT FOREIGN BODY WITHOUT DAMAGE TO NAIL, INITIAL ENCOUNTER: Primary | ICD-10-CM

## 2024-10-31 PROCEDURE — 12001 RPR S/N/AX/GEN/TRNK 2.5CM/<: CPT

## 2024-10-31 PROCEDURE — 99284 EMERGENCY DEPT VISIT MOD MDM: CPT

## 2024-10-31 PROCEDURE — 90471 IMMUNIZATION ADMIN: CPT | Performed by: NURSE PRACTITIONER

## 2024-10-31 PROCEDURE — 90715 TDAP VACCINE 7 YRS/> IM: CPT | Performed by: NURSE PRACTITIONER

## 2024-10-31 PROCEDURE — 6370000000 HC RX 637 (ALT 250 FOR IP): Performed by: NURSE PRACTITIONER

## 2024-10-31 PROCEDURE — 6360000002 HC RX W HCPCS: Performed by: NURSE PRACTITIONER

## 2024-10-31 RX ORDER — AZITHROMYCIN 250 MG/1
500 TABLET, FILM COATED ORAL ONCE
Status: COMPLETED | OUTPATIENT
Start: 2024-10-31 | End: 2024-10-31

## 2024-10-31 RX ORDER — AZITHROMYCIN 250 MG/1
TABLET, FILM COATED ORAL
Qty: 1 PACKET | Refills: 0 | Status: SHIPPED | OUTPATIENT
Start: 2024-10-31 | End: 2024-11-04

## 2024-10-31 RX ADMIN — TETANUS TOXOID, REDUCED DIPHTHERIA TOXOID AND ACELLULAR PERTUSSIS VACCINE, ADSORBED 0.5 ML: 5; 2.5; 8; 8; 2.5 SUSPENSION INTRAMUSCULAR at 16:16

## 2024-10-31 RX ADMIN — AZITHROMYCIN DIHYDRATE 500 MG: 250 TABLET, FILM COATED ORAL at 16:16

## 2024-10-31 ASSESSMENT — ENCOUNTER SYMPTOMS
SORE THROAT: 0
SHORTNESS OF BREATH: 0
WHEEZING: 1
NAUSEA: 0
VOMITING: 0
DIARRHEA: 0
BACK PAIN: 0
COUGH: 1
ABDOMINAL PAIN: 0

## 2024-10-31 ASSESSMENT — PAIN - FUNCTIONAL ASSESSMENT: PAIN_FUNCTIONAL_ASSESSMENT: 0-10

## 2024-10-31 ASSESSMENT — LIFESTYLE VARIABLES
HOW OFTEN DO YOU HAVE A DRINK CONTAINING ALCOHOL: 2-4 TIMES A MONTH
HOW MANY STANDARD DRINKS CONTAINING ALCOHOL DO YOU HAVE ON A TYPICAL DAY: 1 OR 2

## 2024-10-31 ASSESSMENT — PAIN SCALES - GENERAL: PAINLEVEL_OUTOF10: 0

## 2024-10-31 NOTE — ED PROVIDER NOTES
Emergency Department     Faculty Attestation    I performed a history and physical examination of the patient and discussed management with the mid level provider. I reviewed the mid level provider's note and agree with the documented findings and plan of care. Any areas of disagreement are noted on the chart. I was personally present for the key portions of any procedures. I have documented in the chart those procedures where I was not present during the key portions. I have reviewed the emergency nurses triage note. I agree with the chief complaint, past medical history, past surgical history, allergies, medications, social and family history as documented unless otherwise noted below. Documentation of the HPI, Physical Exam and Medical Decision Making performed by medical students or scribes is based on my personal performance of the HPI, PE and MDM. For Physician Assistant/ Nurse Practitioner cases/documentation I have personally evaluated this patient and have completed at least one if not all key elements of the E/M (history, physical exam, and MDM). Additional findings are as noted.      Primary Care Physician:  Dallas Rowland MD    CHIEF COMPLAINT       Chief Complaint   Patient presents with    Laceration     Pt has laceration to left middle finger from cutting bagel  this afternoon       RECENT VITALS:   Temp: 98.5 °F (36.9 °C),  Pulse: 92, Respirations: 16, BP: (!) 144/72    LABS:  Labs Reviewed - No data to display      PERTINENT ATTENDING PHYSICIAN COMMENTS:    Patient with a laceration to her left long finger cutting a bagel it is a very small laceration toward the tip of the finger most likely will be best managed by tissue adhesive  Patient unsure of her tetanus

## 2024-10-31 NOTE — ED PROVIDER NOTES
[facility] EMERGENCY DEPARTMENT  EMERGENCY DEPARTMENT ENCOUNTER      Pt Name: Angy Lanza  MRN: [id number]  Birthdate 1950  Date of evaluation: 10/31/2024  Provider: MURRAY Michele CNP  4:23 PM    CHIEF COMPLAINT       Chief Complaint   Patient presents with    Laceration     Pt has laceration to left middle finger from cutting bagel  this afternoon         HISTORY OF PRESENT ILLNESS    Angy Lanza is a 74 y.o. female who presents to the emergency department      This is a nontoxic-appearing 74-year-old female presenting to the emergency department, reports while trying to cut a bagel around 1 PM today she accidentally the distal aspect of her left hand middle finger, she is anticoagulated with Eliquis due to A-fib, states that she has tried holding pressure dressings, was unsuccessful in getting the bleeding to stop prompting her to come to the emergency department for evaluation, she is unsure regarding her last tetanus immunization.  The patient additionally reports for the past week she has had nasal congestion cough intermittent wheezing, she does have a history of asthma, has a remote history of tobacco smoking, has not had fevers.    The history is provided by the patient and medical records.       Nursing Notes were reviewed.    REVIEW OF SYSTEMS       Review of Systems   Constitutional:  Negative for chills, fatigue and fever.   HENT:  Negative for congestion and sore throat.    Respiratory:  Positive for cough and wheezing. Negative for shortness of breath.    Cardiovascular:  Negative for chest pain and leg swelling.   Gastrointestinal:  Negative for abdominal pain, diarrhea, nausea and vomiting.   Genitourinary:  Negative for dysuria and hematuria.   Musculoskeletal:  Negative for back pain and neck pain.   Skin:  Negative for rash and wound.        Positive for finger laceration.   Neurological:  Negative for numbness and headaches.   Psychiatric/Behavioral:  Negative for  ----- Message from Curtis Potter MD sent at 6/20/2022  4:06 PM CDT -----  Update med list to be 20 mg two po bid for me

## 2024-10-31 NOTE — DISCHARGE INSTRUCTIONS
Complete antibiotic as prescribed.    Continue with inhaler as previously prescribed.    Return to the ER: Fevers not reducing with Tylenol or ibuprofen, chest pain or breathing difficulty, weakness or confusion, increased redness warmth swelling to the finger or streaks of red going up the finger at the hand, nausea, vomiting; or any other concerning symptoms.

## 2024-11-22 RX ORDER — LOSARTAN POTASSIUM 25 MG/1
25 TABLET ORAL 2 TIMES DAILY
Qty: 180 TABLET | Refills: 3 | Status: SHIPPED | OUTPATIENT
Start: 2024-11-22

## 2024-11-22 NOTE — TELEPHONE ENCOUNTER
Angy Lanza is calling to request a refill on the following medication(s):    Medication Request:  Requested Prescriptions     Pending Prescriptions Disp Refills    losartan (COZAAR) 25 MG tablet [Pharmacy Med Name: LOSARTAN POTASSIUM 25 MG TAB] 180 tablet 3     Sig: TAKE 1 TABLET BY MOUTH TWICE A DAY       Last Visit Date (If Applicable):  9/23/2024    Next Visit Date:    3/24/2025

## 2025-03-03 ENCOUNTER — TELEPHONE (OUTPATIENT)
Age: 75
End: 2025-03-03

## 2025-03-03 RX ORDER — FROVATRIPTAN SUCCINATE 2.5 MG/1
2.5 TABLET, FILM COATED ORAL PRN
Qty: 30 TABLET | Refills: 3 | Status: SHIPPED | OUTPATIENT
Start: 2025-03-03

## 2025-03-03 NOTE — TELEPHONE ENCOUNTER
Patient called, was previously taking Frovatriptan 2.5 mg for her migraines and requested that you change it to Sumatriptan because it was much cheaper.    She said that it doesn't even touch her migraines.  Asking if you could please send an order for the Frovatriptan again ?  (End date 09/23/24)    She uses the [facility] in [place].    Thank you.

## 2025-03-14 ENCOUNTER — TELEPHONE (OUTPATIENT)
Age: 75
End: 2025-03-14

## 2025-03-14 RX ORDER — AZITHROMYCIN 250 MG/1
TABLET, FILM COATED ORAL
Qty: 6 TABLET | Refills: 0 | Status: SHIPPED | OUTPATIENT
Start: 2025-03-14 | End: 2025-03-24

## 2025-03-14 NOTE — TELEPHONE ENCOUNTER
You saw PT's  on Wednesday with couch, congestion and now she has the same symptoms, asking for medications to treat.

## 2025-03-21 ENCOUNTER — HOSPITAL ENCOUNTER (OUTPATIENT)
Age: 75
Discharge: HOME | End: 2025-03-21
Payer: MEDICARE

## 2025-03-21 DIAGNOSIS — Z00.00 ENCOUNTER FOR MEDICARE ANNUAL WELLNESS EXAM: ICD-10-CM

## 2025-03-21 LAB
BASOPHILS # BLD: 0.11 K/UL (ref 0–0.2)
BASOPHILS NFR BLD: 1 % (ref 0–2)
CHOLEST SERPL-MCNC: 174 MG/DL (ref 0–199)
CHOLESTEROL/HDL RATIO: 3.9
EOSINOPHIL # BLD: 0.19 K/UL (ref 0–0.44)
EOSINOPHILS RELATIVE PERCENT: 2 % (ref 1–4)
ERYTHROCYTE [DISTWIDTH] IN BLOOD BY AUTOMATED COUNT: 13.9 % (ref 11.8–14.4)
HCT VFR BLD AUTO: 42.6 % (ref 36.3–47.1)
HDLC SERPL-MCNC: 45 MG/DL
HGB BLD-MCNC: 13.2 G/DL (ref 11.9–15.1)
IMM GRANULOCYTES # BLD AUTO: 0.04 K/UL (ref 0–0.3)
IMM GRANULOCYTES NFR BLD: 0 %
LDLC SERPL CALC-MCNC: 100 MG/DL (ref 0–100)
LYMPHOCYTES NFR BLD: 2.64 K/UL (ref 1.1–3.7)
LYMPHOCYTES RELATIVE PERCENT: 29 % (ref 24–43)
MCH RBC QN AUTO: 27.7 PG (ref 25.2–33.5)
MCHC RBC AUTO-ENTMCNC: 31 G/DL (ref 28.4–34.8)
MCV RBC AUTO: 89.3 FL (ref 82.6–102.9)
MONOCYTES NFR BLD: 0.84 K/UL (ref 0.1–1.2)
MONOCYTES NFR BLD: 9 % (ref 3–12)
NEUTROPHILS NFR BLD: 59 % (ref 36–65)
NEUTS SEG NFR BLD: 5.29 K/UL (ref 1.5–8.1)
NRBC BLD-RTO: 0 PER 100 WBC
PLATELET # BLD AUTO: 333 K/UL (ref 138–453)
PMV BLD AUTO: 9.3 FL (ref 8.1–13.5)
RBC # BLD AUTO: 4.77 M/UL (ref 3.95–5.11)
TRIGL SERPL-MCNC: 144 MG/DL
TSH SERPL DL<=0.05 MIU/L-ACNC: 1 UIU/ML (ref 0.27–4.2)
VLDLC SERPL CALC-MCNC: 29 MG/DL (ref 1–30)
WBC OTHER # BLD: 9.1 K/UL (ref 3.5–11.3)

## 2025-03-21 PROCEDURE — 85025 COMPLETE CBC W/AUTO DIFF WBC: CPT

## 2025-03-21 PROCEDURE — 36415 COLL VENOUS BLD VENIPUNCTURE: CPT

## 2025-03-21 PROCEDURE — 80061 LIPID PANEL: CPT

## 2025-03-21 PROCEDURE — 84443 ASSAY THYROID STIM HORMONE: CPT

## 2025-03-21 RX ORDER — BUDESONIDE AND FORMOTEROL FUMARATE DIHYDRATE 160; 4.5 UG/1; UG/1
2 AEROSOL RESPIRATORY (INHALATION) 2 TIMES DAILY
Qty: 10.2 G | Refills: 3 | Status: SHIPPED | OUTPATIENT
Start: 2025-03-21

## 2025-03-21 SDOH — ECONOMIC STABILITY: TRANSPORTATION INSECURITY
IN THE PAST 12 MONTHS, HAS LACK OF TRANSPORTATION KEPT YOU FROM MEETINGS, WORK, OR FROM GETTING THINGS NEEDED FOR DAILY LIVING?: NO

## 2025-03-21 SDOH — ECONOMIC STABILITY: FOOD INSECURITY: WITHIN THE PAST 12 MONTHS, THE FOOD YOU BOUGHT JUST DIDN'T LAST AND YOU DIDN'T HAVE MONEY TO GET MORE.: NEVER TRUE

## 2025-03-21 SDOH — ECONOMIC STABILITY: FOOD INSECURITY: WITHIN THE PAST 12 MONTHS, YOU WORRIED THAT YOUR FOOD WOULD RUN OUT BEFORE YOU GOT MONEY TO BUY MORE.: NEVER TRUE

## 2025-03-21 SDOH — ECONOMIC STABILITY: INCOME INSECURITY: IN THE LAST 12 MONTHS, WAS THERE A TIME WHEN YOU WERE NOT ABLE TO PAY THE MORTGAGE OR RENT ON TIME?: NO

## 2025-03-21 SDOH — ECONOMIC STABILITY: TRANSPORTATION INSECURITY
IN THE PAST 12 MONTHS, HAS THE LACK OF TRANSPORTATION KEPT YOU FROM MEDICAL APPOINTMENTS OR FROM GETTING MEDICATIONS?: NO

## 2025-03-21 NOTE — TELEPHONE ENCOUNTER
Angy Lanza is calling to request a refill on the following medication(s):    Medication Request:  Requested Prescriptions     Pending Prescriptions Disp Refills    SYMBICORT 160-4.5 MCG/ACT AERO [Pharmacy Med Name: SYMBICORT 160-4.5 MCG INHALER] 10.2 g 3     Sig: INHALE 2 PUFFS BY MOUTH TWICE A DAY       Last Visit Date (If Applicable):  9/23/2024    Next Visit Date:    3/24/2025

## 2025-03-23 PROBLEM — I48.91 ATRIAL FIBRILLATION WITH RAPID VENTRICULAR RESPONSE (HCC): Status: RESOLVED | Noted: 2022-07-17 | Resolved: 2025-03-23

## 2025-03-24 ENCOUNTER — OFFICE VISIT (OUTPATIENT)
Age: 75
End: 2025-03-24

## 2025-03-24 ENCOUNTER — HOSPITAL ENCOUNTER (OUTPATIENT)
Age: 75
Setting detail: SPECIMEN
Discharge: HOME | End: 2025-03-24

## 2025-03-24 VITALS
SYSTOLIC BLOOD PRESSURE: 112 MMHG | DIASTOLIC BLOOD PRESSURE: 64 MMHG | TEMPERATURE: 97.5 F | OXYGEN SATURATION: 96 % | BODY MASS INDEX: 34.68 KG/M2 | HEART RATE: 69 BPM | WEIGHT: 208.4 LBS

## 2025-03-24 DIAGNOSIS — R56.9 SEIZURE (HCC): ICD-10-CM

## 2025-03-24 DIAGNOSIS — I10 ESSENTIAL HYPERTENSION: ICD-10-CM

## 2025-03-24 DIAGNOSIS — E78.2 MIXED HYPERLIPIDEMIA: ICD-10-CM

## 2025-03-24 DIAGNOSIS — Z00.00 MEDICARE ANNUAL WELLNESS VISIT, SUBSEQUENT: ICD-10-CM

## 2025-03-24 DIAGNOSIS — I10 PRIMARY HYPERTENSION: ICD-10-CM

## 2025-03-24 DIAGNOSIS — Z13.820 SCREENING FOR OSTEOPOROSIS: ICD-10-CM

## 2025-03-24 DIAGNOSIS — I48.91 ATRIAL FIBRILLATION WITH RVR (HCC): Primary | ICD-10-CM

## 2025-03-24 DIAGNOSIS — I77.810 ASCENDING AORTA DILATATION: ICD-10-CM

## 2025-03-24 DIAGNOSIS — M81.0 POST-MENOPAUSAL OSTEOPOROSIS: ICD-10-CM

## 2025-03-24 DIAGNOSIS — E04.9 GOITER: ICD-10-CM

## 2025-03-24 DIAGNOSIS — Z12.11 SCREENING FOR COLON CANCER: ICD-10-CM

## 2025-03-24 DIAGNOSIS — J45.20 MILD INTERMITTENT ASTHMA WITHOUT COMPLICATION: ICD-10-CM

## 2025-03-24 DIAGNOSIS — M47.812 CERVICAL SPINE ARTHRITIS: ICD-10-CM

## 2025-03-24 LAB
ALBUMIN SERPL-MCNC: 4.3 G/DL (ref 3.5–5.2)
ALBUMIN/GLOB SERPL: 1.4 {RATIO} (ref 1–2.5)
ALP SERPL-CCNC: 132 U/L (ref 35–104)
ALT SERPL-CCNC: 23 U/L (ref 10–35)
ANION GAP SERPL CALCULATED.3IONS-SCNC: 10 MMOL/L (ref 9–16)
AST SERPL-CCNC: 24 U/L (ref 10–35)
BILIRUB SERPL-MCNC: 0.3 MG/DL (ref 0–1.2)
BUN SERPL-MCNC: 17 MG/DL (ref 8–23)
CALCIUM SERPL-MCNC: 9.9 MG/DL (ref 8.6–10.4)
CHLORIDE SERPL-SCNC: 104 MMOL/L (ref 98–107)
CO2 SERPL-SCNC: 27 MMOL/L (ref 20–31)
CREAT SERPL-MCNC: 0.6 MG/DL (ref 0.6–0.9)
GFR, ESTIMATED: >90 ML/MIN/1.73M2
GLUCOSE SERPL-MCNC: 90 MG/DL (ref 74–99)
POTASSIUM SERPL-SCNC: 4.7 MMOL/L (ref 3.7–5.3)
PROT SERPL-MCNC: 7.4 G/DL (ref 6.6–8.7)
SODIUM SERPL-SCNC: 141 MMOL/L (ref 136–145)
T4 FREE SERPL-MCNC: 1.1 NG/DL (ref 0.9–1.7)

## 2025-03-24 RX ORDER — AMMONIUM LACTATE 12 G/100G
CREAM TOPICAL
COMMUNITY
Start: 2025-01-23

## 2025-03-24 ASSESSMENT — PATIENT HEALTH QUESTIONNAIRE - PHQ9
SUM OF ALL RESPONSES TO PHQ QUESTIONS 1-9: 0
2. FEELING DOWN, DEPRESSED OR HOPELESS: NOT AT ALL
SUM OF ALL RESPONSES TO PHQ QUESTIONS 1-9: 0
1. LITTLE INTEREST OR PLEASURE IN DOING THINGS: NOT AT ALL

## 2025-03-24 NOTE — PROGRESS NOTES
[facility]  [facility] MEDICINE  [address]  [place] [address]  Dept: [phone]     Date of Visit:  3/24/2025  Patient Name: Angy Lanza   Patient :  1950     CHIEF COMPLAINT/HPI:     Chief Complaint   Patient presents with    6 Month Follow-Up     Labs from 25, missing CMP        HPI      Angy Lanza, 74 y.o. presents today for a six month follow up.    Angy Lanza denies headaches, dizziness, chest pain, shortness of breath, heartburn/indigestion, nausea, vomiting, diarrhea, constipation, blood in stool, claudication, edema, skin changes, vision changes, fever, or chills.       REVIEW OF SYSTEM      Review of Systems:   Constitutional:  Negative for chills, fatigue, fever and unexpected weight change.   Eyes:  Negative for visual disturbance.   Respiratory:  Negative for cough, chest tightness, shortness of breath and wheezing.    Cardiovascular:  Negative for chest pain, palpitations and leg swelling.   Gastrointestinal:  Negative for abdominal distention, abdominal pain, blood in stool, constipation, diarrhea, nausea and vomiting.   Genitourinary:  Negative for dysuria, hematuria and urgency.   Musculoskeletal:  Negative for back pain, neck pain and neck stiffness.   Skin:  Negative for rash and wound.   Neurological:  Negative for syncope, weakness, light-headedness and headaches.   Hematological:  Negative for adenopathy. Does not bruise/bleed easily.   Psychiatric/Behavioral:  Negative for suicidal ideas. The patient is not nervous/anxious.      REVIEWED INFORMATION      Allergies   Allergen Reactions    Nitroglycerin Other (See Comments)     Hypotension  blood pressure/heart rate severe drop    Oxycodone      severenausea/vomitting    Oxycodone-Acetaminophen      severe nausea/vomitting    Penicillin G      rash,difficulty breathing    Sulfamethizole      rash. breathing difficulty    Macrodantin [Nitrofurantoin] Swelling and Rash

## 2025-03-26 ENCOUNTER — RESULTS FOLLOW-UP (OUTPATIENT)
Age: 75
End: 2025-03-26

## 2025-03-26 NOTE — RESULT ENCOUNTER NOTE
Tell patient Xray shows arthritis, possible disc disease  Option 1  try Medrol dosepak      2       order PT      3       order MRI     What does she want to do ??

## 2025-03-30 DIAGNOSIS — M50.30 DDD (DEGENERATIVE DISC DISEASE), CERVICAL: Primary | ICD-10-CM

## 2025-03-30 DIAGNOSIS — R74.8 ELEVATED ALKALINE PHOSPHATASE LEVEL: ICD-10-CM

## 2025-03-31 ENCOUNTER — HOSPITAL ENCOUNTER (OUTPATIENT)
Age: 75
Discharge: HOME | End: 2025-03-31
Payer: MEDICARE

## 2025-03-31 DIAGNOSIS — R74.8 ELEVATED ALKALINE PHOSPHATASE LEVEL: ICD-10-CM

## 2025-03-31 LAB
ALBUMIN SERPL-MCNC: 4.2 G/DL (ref 3.5–5.2)
ALBUMIN/GLOB SERPL: 1.4 {RATIO} (ref 1–2.5)
ALP SERPL-CCNC: 131 U/L (ref 35–104)
ALT SERPL-CCNC: 23 U/L (ref 10–35)
AST SERPL-CCNC: 24 U/L (ref 10–35)
BILIRUB DIRECT SERPL-MCNC: <0.1 MG/DL (ref 0–0.2)
BILIRUB INDIRECT SERPL-MCNC: ABNORMAL MG/DL (ref 0–1)
BILIRUB SERPL-MCNC: <0.2 MG/DL (ref 0–1.2)
GLOBULIN SER CALC-MCNC: 3.1 G/DL
PROT SERPL-MCNC: 7.3 G/DL (ref 6.6–8.7)

## 2025-03-31 PROCEDURE — 80076 HEPATIC FUNCTION PANEL: CPT

## 2025-03-31 PROCEDURE — 84080 ASSAY ALKALINE PHOSPHATASES: CPT

## 2025-03-31 PROCEDURE — 84075 ASSAY ALKALINE PHOSPHATASE: CPT

## 2025-03-31 PROCEDURE — 36415 COLL VENOUS BLD VENIPUNCTURE: CPT

## 2025-03-31 NOTE — PROGRESS NOTES
Patient notified, voiced understanding.  Said that she is claustrophobic, would like an open MRI.  Will check with Central Scheduling.

## 2025-04-01 ENCOUNTER — TELEPHONE (OUTPATIENT)
Age: 75
End: 2025-04-01

## 2025-04-01 NOTE — TELEPHONE ENCOUNTER
Patient called and was told to call after she had her blood work done. She had them done yesterday.

## 2025-04-02 LAB
ALK PHOS BONE SPECIFIC: 83 U/L (ref 0–55)
ALK PHOS OTHER CALC: 0 U/L
ALK PHOSPHATASE: 160 U/L (ref 40–120)
ALKALINE PHOSPHATASE LIVER FRACTION: 77 U/L (ref 0–94)

## 2025-04-05 ENCOUNTER — RESULTS FOLLOW-UP (OUTPATIENT)
Age: 75
End: 2025-04-05

## 2025-04-05 NOTE — RESULT ENCOUNTER NOTE
Alkaline phosphatase isoenzymes shows slight increase in bone isoenzyme  Not a sign of anything specific ( recent bone contusion??)  Will discuss in office next appt

## 2025-04-07 ENCOUNTER — HOSPITAL ENCOUNTER (OUTPATIENT)
Dept: MRI IMAGING | Facility: CLINIC | Age: 75
Discharge: HOME | End: 2025-04-09
Attending: FAMILY MEDICINE

## 2025-04-07 DIAGNOSIS — M50.30 DDD (DEGENERATIVE DISC DISEASE), CERVICAL: ICD-10-CM

## 2025-04-16 ENCOUNTER — HOSPITAL ENCOUNTER (OUTPATIENT)
Dept: MAMMOGRAPHY | Age: 75
Discharge: HOME | End: 2025-04-18
Attending: FAMILY MEDICINE
Payer: MEDICARE

## 2025-04-16 DIAGNOSIS — M81.0 POST-MENOPAUSAL OSTEOPOROSIS: ICD-10-CM

## 2025-04-16 PROCEDURE — 77080 DXA BONE DENSITY AXIAL: CPT

## 2025-05-02 NOTE — RESULT ENCOUNTER NOTE
Tell patient Dexa showed OSTEOPENIA  She should be on at least  1500mg/day CALCIUM  And 800 IU of VITAMIN D     Call if questions

## 2025-05-19 RX ORDER — MONTELUKAST SODIUM 10 MG/1
10 TABLET ORAL DAILY
Qty: 90 TABLET | Refills: 3 | Status: SHIPPED | OUTPATIENT
Start: 2025-05-19

## 2025-05-19 NOTE — TELEPHONE ENCOUNTER
Angy Lanza is calling to request a refill on the following medication(s):    Medication Request:  Requested Prescriptions     Pending Prescriptions Disp Refills    montelukast (SINGULAIR) 10 MG tablet [Pharmacy Med Name: MONTELUKAST SOD 10 MG TABLET] 90 tablet 3     Sig: TAKE 1 TABLET BY MOUTH DAILY       Last Visit Date (If Applicable):  3/24/2025    Next Visit Date:    9/22/2025

## 2025-08-06 ENCOUNTER — HOSPITAL ENCOUNTER (OUTPATIENT)
Dept: MAMMOGRAPHY | Age: 75
Discharge: HOME | End: 2025-08-08
Payer: MEDICARE

## 2025-08-06 VITALS — HEIGHT: 66 IN | BODY MASS INDEX: 34.07 KG/M2 | WEIGHT: 212 LBS

## 2025-08-06 DIAGNOSIS — Z12.31 VISIT FOR SCREENING MAMMOGRAM: ICD-10-CM

## 2025-08-06 PROCEDURE — 77063 BREAST TOMOSYNTHESIS BI: CPT

## (undated) DEVICE — STRYKER PERFORMANCE SERIES SAGITTAL BLADE: Brand: STRYKER PERFORMANCE SERIES

## (undated) DEVICE — SUTURE MCRYL SZ 3 0 L18IN ABSRB UD PS 2 3 8 CIR REV CUT NDL MCP497G

## (undated) DEVICE — 1 ML TUBERCULIN SYRINGE LUER-LOCK TIP: Brand: MONOJECT

## (undated) DEVICE — SUTURE VCRL SZ 1 L36IN ABSRB UD L48MM CTXB 1/2 CIR BLNT PNT JB977H

## (undated) DEVICE — BANDAGE COBAN 4 IN COMPR W4INXL5YD FOAM COHESIVE QUIK STK SELF ADH SFT

## (undated) DEVICE — GLOVE ORANGE PI 7   MSG9070

## (undated) DEVICE — STANDARD HYPODERMIC NEEDLE,POLYPROPYLENE HUB: Brand: MONOJECT

## (undated) DEVICE — GLOVE SURG SZ 65 THK91MIL LTX FREE SYN POLYISOPRENE

## (undated) DEVICE — SUTURE VCRL + SZ 2-0 L27IN ABSRB CLR CT-1 1/2 CIR TAPERCUT VCP259H

## (undated) DEVICE — CEMENT MIXING SYSTEM WITH FEMORAL BREAKWAY NOZZLE: Brand: REVOLUTION

## (undated) DEVICE — SOLUTION IV 1000ML 0.9% SOD CHL PH 5 INJ USP VIAFLX PLAS

## (undated) DEVICE — ELECTRODE PT RET AD L9FT HI MOIST COND ADH HYDRGEL CORDED

## (undated) DEVICE — PIN DRL DIA1/8IN QUIK REL FOR VANGUARD UNIV INSTR TOT KNEE 32486265] ZIMMER BIOMET ORTHOPEDIC]

## (undated) DEVICE — CLOTH PRE OP W9XL10.5IN 2% CHG FRAGRANCE RNS FREE READYPREP

## (undated) DEVICE — DRAPE,U/ SHT,SPLIT,PLAS,STERIL: Brand: MEDLINE

## (undated) DEVICE — APPLICATOR MEDICATED 26 CC SOLUTION HI LT ORNG CHLORAPREP

## (undated) DEVICE — GLOVE ORANGE PI 8   MSG9080

## (undated) DEVICE — ELECTRODE ES L3IN S STL BLDE INSUL DISP VALLEYLAB EDGE

## (undated) DEVICE — STRAP,POSITIONING,KNEE/BODY,FOAM,4X60": Brand: MEDLINE

## (undated) DEVICE — STOCKINETTE,IMPERVIOUS,12X48,STERILE: Brand: MEDLINE

## (undated) DEVICE — GLOVE SURG SZ 8 L12IN THK75MIL DK GRN LTX FREE

## (undated) DEVICE — PROTECTOR ULN NRV PUR FOAM HK LOOP STRP ANATOMICALLY

## (undated) DEVICE — HANDPIECE SET WITH COAXIAL HIGH FLOW TIP AND SUCTION TUBE: Brand: INTERPULSE

## (undated) DEVICE — SYRINGE MED 50ML LUERLOCK TIP

## (undated) DEVICE — DISPOSABLE TOURNIQUET CUFF SINGLE BLADDER, SINGLE PORT AND QUICK CONNECT CONNECTOR: Brand: COLOR CUFF

## (undated) DEVICE — SOLUTION IRRIG 1000ML 0.9% SOD CHL USP POUR PLAS BTL

## (undated) DEVICE — LIQUIBAND RAPID ADHESIVE 36/CS 0.8ML: Brand: MEDLINE

## (undated) DEVICE — BLANKET WRM W29.9XL79.1IN UP BODY FORC AIR MISTRAL-AIR

## (undated) DEVICE — SOLUTION IRRIG 1000ML STRL H2O USP PLAS POUR BTL

## (undated) DEVICE — BLUNTFILL: Brand: MONOJECT

## (undated) DEVICE — MHPB TOTAL KNEE PACK: Brand: MEDLINE INDUSTRIES, INC.